# Patient Record
Sex: FEMALE | Race: WHITE | Employment: UNEMPLOYED | URBAN - NONMETROPOLITAN AREA
[De-identification: names, ages, dates, MRNs, and addresses within clinical notes are randomized per-mention and may not be internally consistent; named-entity substitution may affect disease eponyms.]

---
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
## 2022-01-24 ENCOUNTER — HOSPITAL ENCOUNTER (EMERGENCY)
Age: 22
Discharge: HOME | End: 2022-01-24
Attending: EMERGENCY MEDICINE
Payer: COMMERCIAL

## 2022-01-24 VITALS
SYSTOLIC BLOOD PRESSURE: 124 MMHG | WEIGHT: 182 LBS | TEMPERATURE: 98.9 F | OXYGEN SATURATION: 98 % | HEART RATE: 70 BPM | RESPIRATION RATE: 16 BRPM | DIASTOLIC BLOOD PRESSURE: 56 MMHG | HEIGHT: 63 IN | BODY MASS INDEX: 32.25 KG/M2

## 2022-01-24 DIAGNOSIS — Z00.00 WELL ADULT HEALTH CHECK: Primary | ICD-10-CM

## 2022-01-24 PROCEDURE — 99284 EMERGENCY DEPT VISIT MOD MDM: CPT

## 2022-01-24 RX ORDER — ACETAMINOPHEN 325 MG/1
650 TABLET ORAL EVERY 6 HOURS PRN
COMMUNITY
End: 2022-06-03 | Stop reason: ALTCHOICE

## 2022-01-24 ASSESSMENT — ENCOUNTER SYMPTOMS
COUGH: 0
CONSTIPATION: 0
WHEEZING: 0
ABDOMINAL DISTENTION: 0
RHINORRHEA: 0
SORE THROAT: 0
DIARRHEA: 0
NAUSEA: 0
SHORTNESS OF BREATH: 0
VOMITING: 0

## 2022-01-24 NOTE — ED PROVIDER NOTES
[facility] ED  Emergency Department        Pt Name: Lito Lara  MRN: [id number]  [place] 2000  Date of evaluation: 1/24/22    CHIEF COMPLAINT       Chief Complaint   Patient presents with    Allergic Reaction     Pt concerned she is having an allergic reaction after eating chicken. C/o hives, dizzy, tired       HISTORY OF PRESENT ILLNESS  (Location/Symptom, Timing/Onset, Context/Setting, Quality, Duration, ModifyingFactors, Severity.)      Lito Laar is a 25 y.o. female who presents with G2, P1, patient states that she was up eating chicken, and started to feel very flushed, and tired. She is about 16 weeks pregnant. She denies any abdominal pain or vaginal bleeding. She says that the thermostat at the house she is staying at was set at 80 degrees. And they were concerned that she was having an allergic reaction to some spices on the chicken that she was eating she denies any throat swelling. She was very tired she laid down she states she fell asleep. But was brought in by significant other due to concern for passing out and having an allergic reaction. Patient denies any complaints at this time is wanting to go home to go to sleep because she feels very tired. PAST MEDICAL / SURGICAL / SOCIAL / FAMILY HISTORY      has a past medical history of ADHD. has no past surgical history on file.        Social History     Socioeconomic History    Marital status: Single     Spouse name: Not on file    Number of children: Not on file    Years of education: Not on file    Highest education level: Not on file   Occupational History    Not on file   Tobacco Use    Smoking status: Current Every Day Smoker     Types: Cigarettes    Smokeless tobacco: Never Used   Vaping Use    Vaping Use: Former   Substance and Sexual Activity    Alcohol use: No    Drug use: Not Currently     Types: Marijuana Olene Ziggy)    Sexual activity: Not Currently     Partners: Male   Other Topics Concern  Not on file   Social History Narrative    Not on file     Social Determinants of Health     Financial Resource Strain:     Difficulty of Paying Living Expenses: Not on file   Food Insecurity:     Worried About Running Out of Food in the Last Year: Not on file    Zaina of Food in the Last Year: Not on file   Transportation Needs:     Lack of Transportation (Medical): Not on file    Lack of Transportation (Non-Medical): Not on file   Physical Activity:     Days of Exercise per Week: Not on file    Minutes of Exercise per Session: Not on file   Stress:     Feeling of Stress : Not on file   Social Connections:     Frequency of Communication with Friends and Family: Not on file    Frequency of Social Gatherings with Friends and Family: Not on file    Attends Hindu Services: Not on file    Active Member of [address] [place] [address] [employer] or Organizations: Not on file    Attends Club or Organization Meetings: Not on file    Marital Status: Not on file   Intimate Partner Violence:     Fear of Current or Ex-Partner: Not on file    Emotionally Abused: Not on file    Physically Abused: Not on file    Sexually Abused: Not on file   Housing Stability:     Unable to Pay for Housing in the Last Year: Not on file    Number of [place] in the Last Year: Not on file    Unstable Housing in the Last Year: Not on file       Family History   Problem Relation Age of Onset    Other Father         needed a lung tranplant    Heart Attack Mother     No Known Problems Brother     No Known Problems Sister     No Known Problems Sister     No Known Problems Sister     No Known Problems Sister     No Known Problems Sister     Breast Cancer Maternal Great Grandmother     No Known Problems Maternal Grandmother     No Known Problems Maternal Grandfather        Allergies:  Patient has no known allergies. Home Medications:  Prior to Admission medications    Medication Sig Start Date End Date Taking?  Authorizing Provider acetaminophen (TYLENOL) 325 MG tablet Take 650 mg by mouth every 6 hours as needed for Pain   Yes Historical Provider, MD   Prenatal Vit-Fe Fumarate-FA (PRENATAL VITAMIN) 27-0.8 MG TABS Take 1 tablet by mouth daily 11/22/21   Rashad Offer, APRN - CNM       REVIEW OF SYSTEMS    (2-9 systems for level 4, 10 or more for level 5)      Review of Systems   Constitutional: Negative for activity change, appetite change, fatigue and fever. HENT: Negative for congestion, rhinorrhea and sore throat. Respiratory: Negative for cough, shortness of breath and wheezing. Cardiovascular: Negative for chest pain, palpitations and leg swelling. Gastrointestinal: Negative for abdominal distention, constipation, diarrhea, nausea and vomiting. Genitourinary: Negative for decreased urine volume and dysuria. Skin: Positive for rash. Neurological: Negative for dizziness, syncope, weakness, light-headedness, numbness and headaches. PHYSICAL EXAM   (up to 7 for level 4, 8 or more for level 5)     INITIAL VITALS:   BP (!) 124/56   Pulse 70   Temp 98.9 °F (37.2 °C) (Tympanic)   Resp 16   Ht 5' 3\" (1.6 m)   Wt 182 lb (82.6 kg)   LMP 09/23/2021   SpO2 98%   BMI 32.24 kg/m²     Physical Exam  Constitutional:       General: She is not in acute distress. Appearance: Normal appearance. She is not ill-appearing. HENT:      Head: Normocephalic and atraumatic. Mouth/Throat:      Mouth: Mucous membranes are moist.      Pharynx: No oropharyngeal exudate or posterior oropharyngeal erythema. Comments: Uvula midline patient handling oral secretions no swelling to the posterior pharynx. No cervical lymphadenopathy palpated. Eyes:      General:         Right eye: No discharge. Left eye: No discharge. Extraocular Movements: Extraocular movements intact. Pupils: Pupils are equal, round, and reactive to light. Cardiovascular:      Rate and Rhythm: Normal rate.    Pulmonary:      Effort: Pulmonary effort is normal. No respiratory distress. Abdominal:      Comments: Patient with gravid uterus, nontender to palpation. Upper abdomen nontender to palpation no rebound or guarding noted, nonperitoneal.   Musculoskeletal:      Right lower leg: No edema. Left lower leg: No edema. Skin:     Comments: Patient has blanching patch of pa papules to her left mid back. No surrounding erythema. Does not appear to be a hive. She has an additional area of erythema approximately 8 mm to her left lateral forearm, that appears to be a bite lia. No other hives noted to her skin   Neurological:      General: No focal deficit present. Mental Status: She is alert and oriented to person, place, and time. DIFFERENTIAL  DIAGNOSIS     Patient well appearing, FHT documented at 170,   BP repeat 124/56. Plan fro discharge home    PLAN (LABS / IMAGING / EKG):  No orders of the defined types were placed in this encounter. MEDICATIONS ORDERED:  No orders of the defined types were placed in this encounter. DIAGNOSTIC RESULTS / EMERGENCY DEPARTMENT COURSE / MDM     LABS:  No results found for this visit on 01/24/22. IMPRESSION: dizziness    Patient well-appearing on exam, repeat blood pressure improved, plan for discharge home. Concern for allergic reaction. Plan for discharge he has follow-up with OB on January 31. FINAL IMPRESSION      1. Well adult health check          DISPOSITION / PLAN     DISPOSITION        PATIENT REFERRED TO:  No follow-up provider specified.     DISCHARGE MEDICATIONS:  New Prescriptions    No medications on file       Amilcar Hare DO  1:26 AM    Attending Emergency Physician  [address] [place] [address] ED    (Please note that portions of this note were completed with a voice recognition program.  Effortswere made to edit the dictations but occasionally words are mis-transcribed.)              Earna Crigler, DO  01/24/22 0300

## 2022-02-10 ENCOUNTER — ROUTINE PRENATAL (OUTPATIENT)
Dept: OBGYN | Age: 22
End: 2022-02-10
Payer: COMMERCIAL

## 2022-02-10 VITALS — DIASTOLIC BLOOD PRESSURE: 66 MMHG | WEIGHT: 181 LBS | BODY MASS INDEX: 32.06 KG/M2 | SYSTOLIC BLOOD PRESSURE: 112 MMHG

## 2022-02-10 DIAGNOSIS — Z34.82 ENCOUNTER FOR SUPERVISION OF OTHER NORMAL PREGNANCY IN SECOND TRIMESTER: ICD-10-CM

## 2022-02-10 DIAGNOSIS — Z3A.18 18 WEEKS GESTATION OF PREGNANCY: Primary | ICD-10-CM

## 2022-02-10 PROCEDURE — G8427 DOCREV CUR MEDS BY ELIG CLIN: HCPCS | Performed by: ADVANCED PRACTICE MIDWIFE

## 2022-02-10 PROCEDURE — 99213 OFFICE O/P EST LOW 20 MIN: CPT | Performed by: ADVANCED PRACTICE MIDWIFE

## 2022-02-10 PROCEDURE — G8419 CALC BMI OUT NRM PARAM NOF/U: HCPCS | Performed by: ADVANCED PRACTICE MIDWIFE

## 2022-02-10 PROCEDURE — G8484 FLU IMMUNIZE NO ADMIN: HCPCS | Performed by: ADVANCED PRACTICE MIDWIFE

## 2022-02-10 PROCEDURE — 4004F PT TOBACCO SCREEN RCVD TLK: CPT | Performed by: ADVANCED PRACTICE MIDWIFE

## 2022-02-10 RX ORDER — PNV NO.95/FERROUS FUM/FOLIC AC 28MG-0.8MG
1 TABLET ORAL DAILY
Qty: 30 TABLET | Refills: 5 | Status: SHIPPED
Start: 2022-02-10 | End: 2022-06-03

## 2022-02-10 NOTE — PROGRESS NOTES
Bertha Oseis is here at 18w3d for:    Chief Complaint   Patient presents with    Routine Prenatal Visit     C/O fatigue, needs prescription for prenatal vitamins. Unable to obtain urine. Estimated Due Date: Estimated Date of Delivery: 22    OB History    Para Term  AB Living   2 1 1     1   SAB IAB Ectopic Molar Multiple Live Births           0 1      # Outcome Date GA Lbr Sorin/2nd Weight Sex Delivery Anes PTL Lv   2 Current            1 Term 20 39w0d 08:49 / 00:07 6 lb 11.6 oz (3.049 kg) F Vag-Spont EPI N GREGORY      Complications: Placental abnormality        Past Medical History:   Diagnosis Date    ADHD        No past surgical history on file. Social History     Tobacco Use   Smoking Status Current Every Day Smoker    Types: Cigarettes   Smokeless Tobacco Never Used        Social History     Substance and Sexual Activity   Alcohol Use No       No results found for this visit on 02/10/22. HPI: here for routine ob visit, denies c/o     PT denies fever, chills, nausea and vomiting       Vitals:  Estimated body mass index is 32.06 kg/m² as calculated from the following:    Height as of 22: 5' 3\" (1.6 m). Weight as of this encounter: 181 lb (82.1 kg). BP: 112/66  Weight - Scale: 181 lb (82.1 kg)  Patient Position: Sitting  Movement: Present             Abdomen: enlarged, gravid,soft, nontender       Results reviewed today:    No results found for this visit on 02/10/22. See prenatal vital sign section and fetal assessment section    ASSESSMENT & Plan    Diagnosis Orders   1. 18 weeks gestation of pregnancy  Prenatal Vit-Fe Fumarate-FA (PRENATAL VITAMINS) 28-0.8 MG TABS   2. Encounter for supervision of other normal pregnancy in second trimester               I am having Yoly Lane start on Prenatal Vitamins. I am also having her maintain her Prenatal Vitamin and acetaminophen. Return in about 2 weeks (around 2022) for ob 20wkus.     There are no Patient Instructions on file for this visit.           MOSHE Arredondo CNM,2/10/2022 4:23 PM

## 2022-02-21 ENCOUNTER — ROUTINE PRENATAL (OUTPATIENT)
Dept: OBGYN | Age: 22
End: 2022-02-21
Payer: COMMERCIAL

## 2022-02-21 ENCOUNTER — HOSPITAL ENCOUNTER (OUTPATIENT)
Age: 22
Setting detail: SPECIMEN
Discharge: HOME | End: 2022-02-21
Payer: COMMERCIAL

## 2022-02-21 VITALS — WEIGHT: 181.2 LBS | BODY MASS INDEX: 32.1 KG/M2 | SYSTOLIC BLOOD PRESSURE: 118 MMHG | DIASTOLIC BLOOD PRESSURE: 72 MMHG

## 2022-02-21 DIAGNOSIS — Z3A.20 20 WEEKS GESTATION OF PREGNANCY: ICD-10-CM

## 2022-02-21 DIAGNOSIS — J00 NASOPHARYNGITIS: Primary | ICD-10-CM

## 2022-02-21 DIAGNOSIS — J00 NASOPHARYNGITIS: ICD-10-CM

## 2022-02-21 DIAGNOSIS — Z34.82 ENCOUNTER FOR SUPERVISION OF OTHER NORMAL PREGNANCY IN SECOND TRIMESTER: ICD-10-CM

## 2022-02-21 PROCEDURE — 87081 CULTURE SCREEN ONLY: CPT

## 2022-02-21 PROCEDURE — G8484 FLU IMMUNIZE NO ADMIN: HCPCS | Performed by: ADVANCED PRACTICE MIDWIFE

## 2022-02-21 PROCEDURE — 99213 OFFICE O/P EST LOW 20 MIN: CPT | Performed by: ADVANCED PRACTICE MIDWIFE

## 2022-02-21 PROCEDURE — G8419 CALC BMI OUT NRM PARAM NOF/U: HCPCS | Performed by: ADVANCED PRACTICE MIDWIFE

## 2022-02-21 PROCEDURE — 4004F PT TOBACCO SCREEN RCVD TLK: CPT | Performed by: ADVANCED PRACTICE MIDWIFE

## 2022-02-21 PROCEDURE — G8427 DOCREV CUR MEDS BY ELIG CLIN: HCPCS | Performed by: ADVANCED PRACTICE MIDWIFE

## 2022-02-21 RX ORDER — FLUTICASONE PROPIONATE 50 MCG
2 SPRAY, SUSPENSION (ML) NASAL DAILY
Qty: 48 G | Refills: 1 | Status: SHIPPED | OUTPATIENT
Start: 2022-02-21 | End: 2022-06-03 | Stop reason: ALTCHOICE

## 2022-02-21 RX ORDER — AZITHROMYCIN 250 MG/1
250 TABLET, FILM COATED ORAL SEE ADMIN INSTRUCTIONS
Qty: 6 TABLET | Refills: 0 | Status: SHIPPED | OUTPATIENT
Start: 2022-02-21 | End: 2022-02-26

## 2022-02-21 NOTE — PROGRESS NOTES
50 MCG/ACT nasal spray    Culture, Throat   2. Encounter for supervision of other normal pregnancy in second trimester     3. 20 weeks gestation of pregnancy               I am having Yoly Lane start on azithromycin and fluticasone. I am also having her maintain her Prenatal Vitamin, acetaminophen, and Prenatal Vitamins. Return in about 4 weeks (around 3/21/2022) for ob. There are no Patient Instructions on file for this visit.           MOSHE Howell CNM,2/21/2022 1:10 PM

## 2022-02-23 LAB
CULTURE: NORMAL
SPECIMEN DESCRIPTION: NORMAL

## 2022-03-21 ENCOUNTER — ROUTINE PRENATAL (OUTPATIENT)
Dept: OBGYN | Age: 22
End: 2022-03-21
Payer: COMMERCIAL

## 2022-03-21 VITALS — BODY MASS INDEX: 32.77 KG/M2 | WEIGHT: 185 LBS | DIASTOLIC BLOOD PRESSURE: 74 MMHG | SYSTOLIC BLOOD PRESSURE: 122 MMHG

## 2022-03-21 DIAGNOSIS — Z34.83 ENCOUNTER FOR SUPERVISION OF OTHER NORMAL PREGNANCY IN THIRD TRIMESTER: Primary | ICD-10-CM

## 2022-03-21 DIAGNOSIS — Z3A.24 24 WEEKS GESTATION OF PREGNANCY: ICD-10-CM

## 2022-03-21 DIAGNOSIS — Z3A.28 28 WEEKS GESTATION OF PREGNANCY: Primary | ICD-10-CM

## 2022-03-21 PROCEDURE — G8484 FLU IMMUNIZE NO ADMIN: HCPCS | Performed by: ADVANCED PRACTICE MIDWIFE

## 2022-03-21 PROCEDURE — 99213 OFFICE O/P EST LOW 20 MIN: CPT | Performed by: ADVANCED PRACTICE MIDWIFE

## 2022-03-21 PROCEDURE — G8427 DOCREV CUR MEDS BY ELIG CLIN: HCPCS | Performed by: ADVANCED PRACTICE MIDWIFE

## 2022-03-21 PROCEDURE — 4004F PT TOBACCO SCREEN RCVD TLK: CPT | Performed by: ADVANCED PRACTICE MIDWIFE

## 2022-03-21 PROCEDURE — G8419 CALC BMI OUT NRM PARAM NOF/U: HCPCS | Performed by: ADVANCED PRACTICE MIDWIFE

## 2022-03-21 NOTE — PROGRESS NOTES
Liz Negrete is here at 24w0d for:    Chief Complaint   Patient presents with    Routine Prenatal Visit     Instructions and orders for 1 hour gtt. at 28 weeks. Unable to obtain urine. C/O feeling dizzy off and on and fatigue. C/O toothache. Estimated Due Date: Estimated Date of Delivery: 22    OB History    Para Term  AB Living   2 1 1     1   SAB IAB Ectopic Molar Multiple Live Births           0 1      # Outcome Date GA Lbr Sorin/2nd Weight Sex Delivery Anes PTL Lv   2 Current            1 Term 20 39w0d 08:49 / 00:07 6 lb 11.6 oz (3.049 kg) F Vag-Spont EPI N GREGORY      Complications: Placental abnormality        Past Medical History:   Diagnosis Date    ADHD        No past surgical history on file. Social History     Tobacco Use   Smoking Status Current Every Day Smoker    Types: Cigarettes   Smokeless Tobacco Never Used        Social History     Substance and Sexual Activity   Alcohol Use No       No results found for this visit on 22. HPI: here for routine ob visit, c/o fatigue and toothache, has not had teeth worked on because she didn't think she could have \"numbing medicine\"     PT denies fever, chills, nausea and vomiting       Vitals:  Estimated body mass index is 32.77 kg/m² as calculated from the following:    Height as of 22: 5' 3\" (1.6 m). Weight as of this encounter: 185 lb (83.9 kg). BP: 122/74  Weight - Scale: 185 lb (83.9 kg)  Patient Position: Sitting  Movement: Present               Results reviewed today:    No results found for this visit on 22. ASSESSMENT & Plan    Diagnosis Orders   1. Encounter for supervision of other normal pregnancy in third trimester     2. 24 weeks gestation of pregnancy       Reassured to go to dentist and local anesthetic can be used    do labwork this week instead of waiting until 28    I am having Yoly [place] maintain her Prenatal Vitamin, acetaminophen, Prenatal Vitamins, and fluticasone. Return in about 4 weeks (around 4/18/2022) for ob. There are no Patient Instructions on file for this visit.           Lenette Lombard, APRN - CNM,3/21/2022 12:55 PM

## 2022-04-10 ENCOUNTER — HOSPITAL ENCOUNTER (EMERGENCY)
Age: 22
Discharge: STILL ADMITTED OR ADMINISTRATIVE | End: 2022-04-10
Attending: EMERGENCY MEDICINE
Payer: COMMERCIAL

## 2022-04-10 ENCOUNTER — HOSPITAL ENCOUNTER (OUTPATIENT)
Age: 22
Discharge: HOME | End: 2022-04-11
Attending: MIDWIFE | Admitting: MIDWIFE
Payer: COMMERCIAL

## 2022-04-10 PROBLEM — O46.92 VAGINAL BLEEDING IN PREGNANCY, SECOND TRIMESTER: Status: ACTIVE | Noted: 2022-04-10

## 2022-04-10 LAB
-: ABNORMAL
BACTERIA: ABNORMAL
BILIRUBIN URINE: NEGATIVE
COLOR: YELLOW
EPITHELIAL CELLS UA: ABNORMAL /HPF (ref 0–25)
GLUCOSE URINE: NEGATIVE
KETONES, URINE: NEGATIVE
LEUKOCYTE ESTERASE, URINE: ABNORMAL
NITRITE, URINE: NEGATIVE
PH UA: 6.5 (ref 5–9)
PROTEIN UA: NEGATIVE
RBC UA: ABNORMAL /HPF (ref 0–2)
SPECIFIC GRAVITY UA: 1.01 (ref 1.01–1.02)
TURBIDITY: CLEAR
URINE HGB: ABNORMAL
UROBILINOGEN, URINE: NORMAL
WBC UA: ABNORMAL /HPF (ref 0–5)

## 2022-04-10 PROCEDURE — 81001 URINALYSIS AUTO W/SCOPE: CPT

## 2022-04-10 PROCEDURE — 99281 EMR DPT VST MAYX REQ PHY/QHP: CPT

## 2022-04-10 PROCEDURE — 87086 URINE CULTURE/COLONY COUNT: CPT

## 2022-04-10 NOTE — LETTER
[facility] and Delivery  [address]  Phone: [phone]    No name on file. April 11, 2022     Patient: Reynold Giang   YOB: 2000   Date of Visit: 4/10/2022       To Whom It May Concern:    Claude Comber accompanied patient for an outpatient visit. If you have any questions or concerns, please don't hesitate to call. Sincerely,        No name on file.

## 2022-04-10 NOTE — LETTER
[facility] and Delivery  [address]  Phone: [phone]    No name on file. April 11, 2022     Patient: Jazmin Alonso   YOB: 2000   Date of Visit: 4/10/2022       To Whom It May Concern:    Chuyita Black accompanied patient for an out patient visit. If you have any questions or concerns, please don't hesitate to call. Sincerely,        No name on file.

## 2022-04-11 ENCOUNTER — TELEPHONE (OUTPATIENT)
Dept: OBGYN | Age: 22
End: 2022-04-11

## 2022-04-11 ENCOUNTER — APPOINTMENT (OUTPATIENT)
Dept: ULTRASOUND IMAGING | Age: 22
End: 2022-04-11
Payer: COMMERCIAL

## 2022-04-11 VITALS
HEIGHT: 63 IN | WEIGHT: 182 LBS | TEMPERATURE: 98.2 F | DIASTOLIC BLOOD PRESSURE: 57 MMHG | BODY MASS INDEX: 32.25 KG/M2 | HEART RATE: 68 BPM | SYSTOLIC BLOOD PRESSURE: 119 MMHG | RESPIRATION RATE: 16 BRPM

## 2022-04-11 PROCEDURE — 2580000003 HC RX 258: Performed by: MIDWIFE

## 2022-04-11 PROCEDURE — 76815 OB US LIMITED FETUS(S): CPT

## 2022-04-11 PROCEDURE — 6360000002 HC RX W HCPCS: Performed by: MIDWIFE

## 2022-04-11 PROCEDURE — 6370000000 HC RX 637 (ALT 250 FOR IP): Performed by: MIDWIFE

## 2022-04-11 RX ORDER — SODIUM CHLORIDE, SODIUM LACTATE, POTASSIUM CHLORIDE, CALCIUM CHLORIDE 600; 310; 30; 20 MG/100ML; MG/100ML; MG/100ML; MG/100ML
INJECTION, SOLUTION INTRAVENOUS CONTINUOUS
Status: DISCONTINUED | OUTPATIENT
Start: 2022-04-11 | End: 2022-04-11 | Stop reason: HOSPADM

## 2022-04-11 RX ORDER — NIFEDIPINE 10 MG/1
10 CAPSULE ORAL EVERY 8 HOURS SCHEDULED
Status: DISCONTINUED | OUTPATIENT
Start: 2022-04-11 | End: 2022-04-11 | Stop reason: HOSPADM

## 2022-04-11 RX ORDER — SODIUM CHLORIDE, SODIUM LACTATE, POTASSIUM CHLORIDE, CALCIUM CHLORIDE 600; 310; 30; 20 MG/100ML; MG/100ML; MG/100ML; MG/100ML
500 INJECTION, SOLUTION INTRAVENOUS ONCE
Status: COMPLETED | OUTPATIENT
Start: 2022-04-11 | End: 2022-04-11

## 2022-04-11 RX ORDER — CEPHALEXIN 500 MG/1
500 CAPSULE ORAL 2 TIMES DAILY
Status: DISCONTINUED | OUTPATIENT
Start: 2022-04-11 | End: 2022-04-11 | Stop reason: HOSPADM

## 2022-04-11 RX ADMIN — SODIUM CHLORIDE, POTASSIUM CHLORIDE, SODIUM LACTATE AND CALCIUM CHLORIDE 500 ML: 600; 310; 30; 20 INJECTION, SOLUTION INTRAVENOUS at 00:33

## 2022-04-11 RX ADMIN — SODIUM CHLORIDE, POTASSIUM CHLORIDE, SODIUM LACTATE AND CALCIUM CHLORIDE: 600; 310; 30; 20 INJECTION, SOLUTION INTRAVENOUS at 04:12

## 2022-04-11 RX ADMIN — CEPHALEXIN 500 MG: 500 CAPSULE ORAL at 07:47

## 2022-04-11 RX ADMIN — NIFEDIPINE 10 MG: 10 CAPSULE ORAL at 01:53

## 2022-04-11 RX ADMIN — NIFEDIPINE 10 MG: 10 CAPSULE ORAL at 07:48

## 2022-04-11 RX ADMIN — SODIUM CHLORIDE, POTASSIUM CHLORIDE, SODIUM LACTATE AND CALCIUM CHLORIDE: 600; 310; 30; 20 INJECTION, SOLUTION INTRAVENOUS at 01:08

## 2022-04-11 RX ADMIN — CEFAZOLIN SODIUM 2000 MG: 10 INJECTION, POWDER, FOR SOLUTION INTRAVENOUS at 00:52

## 2022-04-11 ASSESSMENT — PAIN DESCRIPTION - DESCRIPTORS: DESCRIPTORS: SHARP

## 2022-04-11 NOTE — FLOWSHEET NOTE
Permission for Ultrasound obtained from [facility], Shilpi Andres.   Imaging notified, will be in for exam.

## 2022-04-11 NOTE — FLOWSHEET NOTE
[facility], additional orders received. Ultrasound ordered for now.

## 2022-04-11 NOTE — FLOWSHEET NOTE
Visual inspection of perineum shows small amount of bright red bleeding at vaginal opening, no blood on peripad. [facility] notified.

## 2022-04-11 NOTE — FLOWSHEET NOTE
Pt arrived per wheelchair from ER with c/o bright red bleeding after urinating. Large amount on toilet paper and in water. Denies urinary symptoms. C/O intermittent pain to left lower quadrant and left flank that started after she urinated. Last intercourse x 2 days ago. Normal BM today.

## 2022-04-11 NOTE — FLOWSHEET NOTE
Jerad Diaz CNM called the unit at this time. Update given on reactive strip, pt sleeping throughout the night, occasional contractions. Confirmed blood type is A positive. States pt can go home. Have  perscriptions from pharmacy. Ordered procardia 10mg 1 tab Q8hr with a dispense of 90pills and one refill of 90pills and Keflex 500mg 1 PO BID dispense 14 with no refills. S Boni TERRELL also takes for pt to call FEI Serra CNM Tuesday or Wednesday for results of urine culture and to make an appointment to be seen in office this week.

## 2022-04-11 NOTE — FLOWSHEET NOTE
Phoned [facility] & [facility] to notify of pt arrival and complaints. Orders received.

## 2022-04-11 NOTE — ED NOTES
Patient taken to [place] for evaluation of vaginal bleeding.       Braulio Watkins RN  04/10/22 1979

## 2022-04-11 NOTE — TELEPHONE ENCOUNTER
Patient called and left message with questions about labs. I called patient back however voicemail not set up unable to leave message.

## 2022-04-11 NOTE — FLOWSHEET NOTE
Pt up to BR, voided 200cc clear yellow urine. No blood on peripad.   Scant amount of bright red blood on toilet paper after wiping, about the size of a 50 cent piece

## 2022-04-11 NOTE — FLOWSHEET NOTE
Work release provided and pt DCd home at this time; she left unit ambulatory to private car with no distress noted; copy of DC inst in hand along with meds provided; accompanied by family.  Rx called to [facility]

## 2022-04-11 NOTE — FLOWSHEET NOTE
Davian Ladd CNM called the unit. New orders received. Ordered to perform SVE, Boni stated the 20 week ultrasound did not show a low lying placenta. Call FEI Plata CNM with results of SVE.

## 2022-04-11 NOTE — FLOWSHEET NOTE
[facility] notified of US results of placenta posterior, cervical length 5.08, no abruption or bleeding visualized per US tech.

## 2022-04-11 NOTE — FLOWSHEET NOTE
IV DCd at this time with catheter tip intact, pt nathalia well; DC instructions explained with pt signature and verb understanding obtained; 1 dose of Procardia and 1 dose of Keflex sent home with pt for to take at 1000 per S Hotelling CNMs instruction; pt verb understanding of how and when to take meds; she also understands that she needs to  Rx from pharmacy later today as well and will take all antibiotics until completed; EFM discontinued and pt up to dress.

## 2022-04-12 LAB
CULTURE: NORMAL
SPECIMEN DESCRIPTION: NORMAL

## 2022-04-15 ENCOUNTER — HOSPITAL ENCOUNTER (OUTPATIENT)
Age: 22
Discharge: HOME | End: 2022-04-15

## 2022-04-15 DIAGNOSIS — Z3A.28 28 WEEKS GESTATION OF PREGNANCY: ICD-10-CM

## 2022-04-18 ENCOUNTER — ROUTINE PRENATAL (OUTPATIENT)
Dept: OBGYN | Age: 22
End: 2022-04-18
Payer: COMMERCIAL

## 2022-04-18 ENCOUNTER — HOSPITAL ENCOUNTER (OUTPATIENT)
Age: 22
Discharge: HOME | End: 2022-04-18
Payer: COMMERCIAL

## 2022-04-18 VITALS — DIASTOLIC BLOOD PRESSURE: 64 MMHG | WEIGHT: 191 LBS | BODY MASS INDEX: 33.83 KG/M2 | SYSTOLIC BLOOD PRESSURE: 122 MMHG

## 2022-04-18 DIAGNOSIS — Z34.83 ENCOUNTER FOR SUPERVISION OF OTHER NORMAL PREGNANCY IN THIRD TRIMESTER: Primary | ICD-10-CM

## 2022-04-18 DIAGNOSIS — Z3A.28 28 WEEKS GESTATION OF PREGNANCY: ICD-10-CM

## 2022-04-18 DIAGNOSIS — Z3A.28 28 WEEKS GESTATION OF PREGNANCY: Primary | ICD-10-CM

## 2022-04-18 LAB
GLUCOSE ADMINISTRATION: NORMAL
GLUCOSE TOLERANCE SCREEN 50G: 95 MG/DL (ref 70–135)
HEMOGLOBIN: 13.3 G/DL (ref 11.9–15.1)

## 2022-04-18 PROCEDURE — 82950 GLUCOSE TEST: CPT

## 2022-04-18 PROCEDURE — G8419 CALC BMI OUT NRM PARAM NOF/U: HCPCS | Performed by: ADVANCED PRACTICE MIDWIFE

## 2022-04-18 PROCEDURE — 4004F PT TOBACCO SCREEN RCVD TLK: CPT | Performed by: ADVANCED PRACTICE MIDWIFE

## 2022-04-18 PROCEDURE — 99213 OFFICE O/P EST LOW 20 MIN: CPT | Performed by: ADVANCED PRACTICE MIDWIFE

## 2022-04-18 PROCEDURE — G8427 DOCREV CUR MEDS BY ELIG CLIN: HCPCS | Performed by: ADVANCED PRACTICE MIDWIFE

## 2022-04-18 PROCEDURE — 85018 HEMOGLOBIN: CPT

## 2022-04-18 PROCEDURE — 36415 COLL VENOUS BLD VENIPUNCTURE: CPT

## 2022-04-18 NOTE — PROGRESS NOTES
Reynold Giang is here at 28w0d for:    Chief Complaint   Patient presents with    Routine Prenatal Visit     Patient had 1 hour gtt. drawn today, results in process. Prenatal vitamins cause nausea. Estimated Due Date: Estimated Date of Delivery: 22    OB History    Para Term  AB Living   2 1 1     1   SAB IAB Ectopic Molar Multiple Live Births           0 1      # Outcome Date GA Lbr Sorin/2nd Weight Sex Delivery Anes PTL Lv   2 Current            1 Term 20 39w0d 08:49 / 00:07 6 lb 11.6 oz (3.049 kg) F Vag-Spont EPI N GREGORY      Complications: Placental abnormality        Past Medical History:   Diagnosis Date    ADHD        No past surgical history on file. Social History     Tobacco Use   Smoking Status Current Every Day Smoker    Types: Cigarettes   Smokeless Tobacco Never Used        Social History     Substance and Sexual Activity   Alcohol Use No       No results found for this visit on 22. HPI: here for routine ob visit, was in ob with bleeding and cramping, was on antibiotics (stopped yesterday) and is taking     PT denies fever, chills, nausea and vomiting       Vitals:  Estimated body mass index is 33.83 kg/m² as calculated from the following:    Height as of 4/10/22: 5' 3\" (1.6 m). Weight as of this encounter: 191 lb (86.6 kg). BP: 122/64  Weight: 191 lb (86.6 kg)  Patient Position: Sitting  Albumin: Negative  Glucose: Negative  Movement: Present           Abdomen: round, gravid,soft, nontender    Results reviewed today:    No results found for this visit on 22. ASSESSMENT & Plan    Diagnosis Orders   1. Encounter for supervision of other normal pregnancy in third trimester     2. 28 weeks gestation of pregnancy         continue procardia      I am having Yoly [place]uleyma [place] maintain her Prenatal Vitamin, acetaminophen, Prenatal Vitamins, and fluticasone. Return in about 2 weeks (around 2022) for ob 30wkUS+tdap.     There are no Patient Instructions on file for this visit.           [facility] [address]. 4:28 PM

## 2022-04-22 ENCOUNTER — HOSPITAL ENCOUNTER (OUTPATIENT)
Age: 22
Discharge: HOME | End: 2022-04-22
Attending: MIDWIFE | Admitting: MIDWIFE
Payer: COMMERCIAL

## 2022-04-22 VITALS
HEART RATE: 78 BPM | HEIGHT: 63 IN | WEIGHT: 191 LBS | RESPIRATION RATE: 18 BRPM | BODY MASS INDEX: 33.84 KG/M2 | DIASTOLIC BLOOD PRESSURE: 66 MMHG | SYSTOLIC BLOOD PRESSURE: 123 MMHG

## 2022-04-22 PROBLEM — O46.90 VAGINAL BLEEDING DURING PREGNANCY, ANTEPARTUM: Status: ACTIVE | Noted: 2022-04-22

## 2022-04-22 LAB
-: ABNORMAL
BACTERIA: ABNORMAL
BILIRUBIN URINE: NEGATIVE
COLOR: YELLOW
EPITHELIAL CELLS UA: ABNORMAL /HPF (ref 0–25)
GLUCOSE URINE: NEGATIVE
KETONES, URINE: NEGATIVE
LEUKOCYTE ESTERASE, URINE: ABNORMAL
MUCUS: ABNORMAL
NITRITE, URINE: NEGATIVE
PH UA: 7 (ref 5–9)
PROTEIN UA: NEGATIVE
RBC UA: ABNORMAL /HPF (ref 0–2)
SPECIFIC GRAVITY UA: 1.02 (ref 1.01–1.02)
TURBIDITY: CLEAR
URINE HGB: ABNORMAL
UROBILINOGEN, URINE: NORMAL
WBC UA: ABNORMAL /HPF (ref 0–5)

## 2022-04-22 PROCEDURE — 81001 URINALYSIS AUTO W/SCOPE: CPT

## 2022-05-02 ENCOUNTER — ROUTINE PRENATAL (OUTPATIENT)
Dept: OBGYN | Age: 22
End: 2022-05-02
Payer: COMMERCIAL

## 2022-05-02 VITALS — WEIGHT: 193 LBS | BODY MASS INDEX: 34.19 KG/M2 | SYSTOLIC BLOOD PRESSURE: 124 MMHG | DIASTOLIC BLOOD PRESSURE: 74 MMHG

## 2022-05-02 DIAGNOSIS — Z34.83 ENCOUNTER FOR SUPERVISION OF OTHER NORMAL PREGNANCY IN THIRD TRIMESTER: Primary | ICD-10-CM

## 2022-05-02 DIAGNOSIS — Z3A.30 30 WEEKS GESTATION OF PREGNANCY: ICD-10-CM

## 2022-05-02 PROCEDURE — G8427 DOCREV CUR MEDS BY ELIG CLIN: HCPCS | Performed by: ADVANCED PRACTICE MIDWIFE

## 2022-05-02 PROCEDURE — 99213 OFFICE O/P EST LOW 20 MIN: CPT | Performed by: ADVANCED PRACTICE MIDWIFE

## 2022-05-02 PROCEDURE — G8419 CALC BMI OUT NRM PARAM NOF/U: HCPCS | Performed by: ADVANCED PRACTICE MIDWIFE

## 2022-05-02 PROCEDURE — 4004F PT TOBACCO SCREEN RCVD TLK: CPT | Performed by: ADVANCED PRACTICE MIDWIFE

## 2022-05-02 NOTE — PATIENT INSTRUCTIONS
Patient Education        Tdap (Tetanus, Diphtheria, Pertussis) Vaccine: What You Need to Know  Why get vaccinated? Tdap vaccine can prevent tetanus, diphtheria, and pertussis. Diphtheria and pertussis spread from person to person. Tetanus enters the bodythrough cuts or wounds.  TETANUS (T) causes painful stiffening of the muscles. Tetanus can lead to serious health problems, including being unable to open the mouth, having trouble swallowing and breathing, or death.  DIPHTHERIA (D) can lead to difficulty breathing, heart failure, paralysis, or death.  PERTUSSIS (aP), also known as \"whooping cough,\" can cause uncontrollable, violent coughing that makes it hard to breathe, eat, or drink. Pertussis can be extremely serious especially in babies and young children, causing pneumonia, convulsions, brain damage, or death. In teens and adults, it can cause weight loss, loss of bladder control, passing out, and rib fractures from severe coughing. Tdap vaccine  Tdap is only for children 7 years and older, adolescents, and adults. Adolescents should receive a single dose of Tdap, preferably at age 6 or 15 years. Pregnant people should get a dose of Tdap during every pregnancy, preferably during the early part of the third trimester, to help protect the  from pertussis. Infants are most at risk for severe, life-threatening complications frompertussis. Adults who have never received Tdap should get a dose of Tdap. Also, adults should receive a booster dose of either Tdap or Td (a different vaccine that protects against tetanus and diphtheria but not pertussis) every 10 years, or after 5 years in the case of a severe or dirty wound or burn. Tdap may be given at the same time as other vaccines.   Talk with your health care provider  Tell your vaccination provider if the person getting the vaccine:   Has had an allergic reaction after a previous dose of any vaccine that protects against tetanus, diphtheria, or pertussis, or has any severe, life-threatening allergies   Has had a coma, decreased level of consciousness, or prolonged seizures within 7 days after a previous dose of any pertussis vaccine (DTP, DTaP, or Tdap)   Has seizures or another nervous system problem   Has ever had Guillain-Barré Syndrome (also called \"GBS\")   Has had severe pain or swelling after a previous dose of any vaccine that protects against tetanus or diphtheria  In some cases, your health care provider may decide to postpone Tdapvaccination until a future visit. People with minor illnesses, such as a cold, may be vaccinated. People who are moderately or severely ill should usually wait until they recover beforegetting Tdap vaccine. Your health care provider can give you more information. Risks of a vaccine reaction   Pain, redness, or swelling where the shot was given, mild fever, headache, feeling tired, and nausea, vomiting, diarrhea, or stomachache sometimes happen after Tdap vaccination. People sometimes faint after medical procedures, including vaccination. Tellyour provider if you feel dizzy or have vision changes or ringing in the ears. As with any medicine, there is a very remote chance of a vaccine causing asevere allergic reaction, other serious injury, or death. What if there is a serious problem? An allergic reaction could occur after the vaccinated person leaves the clinic. If you see signs of a severe allergic reaction (hives, swelling of the face and throat, difficulty breathing, a fast heartbeat, dizziness, or weakness), call 9-1-1 and get the person to the nearest hospital.  For other signs that concern you, call your health care provider. Adverse reactions should be reported to the Vaccine Adverse Event Reporting System (VAERS). Your health care provider will usually file this report, or you can do it yourself. Visit the VAERS website at www.vaers. hhs.gov or call 9-580.748.8879.  VAERS is only for reporting reactions, and [facility] staff members do not give medical advice. The National Vaccine Injury Compensation Program  The National Vaccine Injury Compensation Program (VICP) is a federal program that was created to compensate people who may have been injured by certain vaccines. Claims regarding alleged injury or death due to vaccination have a time limit for filing, which may be as short as two years. Visit the VICP website at www.[facility].gov/vaccinecompensation or call 6-577.259.9836 to learn about the program and about filing a claim. How can I learn more?  Ask your health care provider.  Call your local or state health department.  Visit the website of the Food and Drug Administration (FDA) for vaccine package inserts and additional information at www.fda.gov/vaccines-blood-biologics/vaccines.  Contact the Centers for Disease Control and Prevention (CDC):  ? Call 2-745.734.7783 (1-800-CDC-INFO) or  ? Visit CDC's website at www.cdc.gov/vaccines. Vaccine Information Statement  Tdap (Tetanus, Diphtheria, Pertussis) Vaccine  8/6/2021  42 UZuleyma Osullivan [id number]  [facility] and [facility] for Disease Control and Prevention  Many vaccine information statements are available in [place] and other languages. See www.immunize.org/vis  Hojas de información sobre vacunas están disponibles en español y en muchos otros idiomas. Visite www.immunize.org/vis  Care instructions adapted under license by [facility] ([facility]). If you have questions about a medical condition or this instruction, always ask your healthcare professional. [place] [address] any warranty or liability for your use of this information. Patient Education        Tdap (Tetanus, Diphtheria, Pertussis) Vaccine: What You Need to Know  Why get vaccinated? Tdap vaccine can prevent tetanus, diphtheria, and pertussis. Diphtheria and pertussis spread from person to person. Tetanus enters the bodythrough cuts or wounds.    TETANUS (T) causes painful stiffening of the muscles. Tetanus can lead to serious health problems, including being unable to open the mouth, having trouble swallowing and breathing, or death.  DIPHTHERIA (D) can lead to difficulty breathing, heart failure, paralysis, or death.  PERTUSSIS (aP), also known as \"whooping cough,\" can cause uncontrollable, violent coughing that makes it hard to breathe, eat, or drink. Pertussis can be extremely serious especially in babies and young children, causing pneumonia, convulsions, brain damage, or death. In teens and adults, it can cause weight loss, loss of bladder control, passing out, and rib fractures from severe coughing. Tdap vaccine  Tdap is only for children 7 years and older, adolescents, and adults. Adolescents should receive a single dose of Tdap, preferably at age 6 or 15 years. Pregnant people should get a dose of Tdap during every pregnancy, preferably during the early part of the third trimester, to help protect the  from pertussis. Infants are most at risk for severe, life-threatening complications frompertussis. Adults who have never received Tdap should get a dose of Tdap. Also, adults should receive a booster dose of either Tdap or Td (a different vaccine that protects against tetanus and diphtheria but not pertussis) every 10 years, or after 5 years in the case of a severe or dirty wound or burn. Tdap may be given at the same time as other vaccines.   Talk with your health care provider  Tell your vaccination provider if the person getting the vaccine:   Has had an allergic reaction after a previous dose of any vaccine that protects against tetanus, diphtheria, or pertussis, or has any severe, life-threatening allergies   Has had a coma, decreased level of consciousness, or prolonged seizures within 7 days after a previous dose of any pertussis vaccine (DTP, DTaP, or Tdap)   Has seizures or another nervous system problem   Has ever had Guillain-Barré Syndrome (also called \"GBS\")   Has had severe pain or swelling after a previous dose of any vaccine that protects against tetanus or diphtheria  In some cases, your health care provider may decide to postpone Tdapvaccination until a future visit. People with minor illnesses, such as a cold, may be vaccinated. People who are moderately or severely ill should usually wait until they recover beforegetting Tdap vaccine. Your health care provider can give you more information. Risks of a vaccine reaction   Pain, redness, or swelling where the shot was given, mild fever, headache, feeling tired, and nausea, vomiting, diarrhea, or stomachache sometimes happen after Tdap vaccination. People sometimes faint after medical procedures, including vaccination. Tellyour provider if you feel dizzy or have vision changes or ringing in the ears. As with any medicine, there is a very remote chance of a vaccine causing asevere allergic reaction, other serious injury, or death. What if there is a serious problem? An allergic reaction could occur after the vaccinated person leaves the clinic. If you see signs of a severe allergic reaction (hives, swelling of the face and throat, difficulty breathing, a fast heartbeat, dizziness, or weakness), call 9-1-1 and get the person to the nearest hospital.  For other signs that concern you, call your health care provider. Adverse reactions should be reported to the Vaccine Adverse Event Reporting System (VAERS). Your health care provider will usually file this report, or you can do it yourself. Visit the VAERS website at www.vaers. hhs.gov or call 2-975.450.9183. VAERS is only for reporting reactions, and VAERS staff members do not give medical advice. The National Vaccine Injury Compensation Program  The National Vaccine Injury Compensation Program (VICP) is a federal program that was created to compensate people who may have been injured by certain vaccines.  Claims regarding alleged injury or death due to vaccination have a time limit for filing, which may be as short as two years. Visit the VICP website at www.[facility].gov/vaccinecompensation or call 1-327.149.1823 to learn about the program and about filing a claim. How can I learn more?  Ask your health care provider.  Call your local or state health department.  Visit the website of the Food and Drug Administration (FDA) for vaccine package inserts and additional information at www.fda.gov/vaccines-blood-biologics/vaccines.  Contact the Centers for Disease Control and Prevention (CDC):  ? Call 6-412.185.3257 (1-800-CDC-INFO) or  ? Visit CDC's website at www.cdc.gov/vaccines. Vaccine Information Statement  Tdap (Tetanus, Diphtheria, Pertussis) Vaccine  8/6/2021  42 NIKIZuleyma Annesabiha [id number]  [facility] for Disease Control and Prevention  Many vaccine information statements are available in [place]i and other languages. See www.immunize.org/vis  Hojas de información sobre vacunas están disponibles en español y en muchos otros idiomas. Visite www.immunize.org/vis  Care instructions adapted under license by [facility] ([facility]). If you have questions about a medical condition or this instruction, always ask your healthcare professional. [address] any warranty or liability for your use of this information.

## 2022-05-02 NOTE — PROGRESS NOTES
Callum Romero is here at 30w0d for:    Chief Complaint   Patient presents with    Routine Prenatal Visit     Follow up in house ultrasound. Unable to obtain urine. Decline TDAP. Estimated Due Date: Estimated Date of Delivery: 22    OB History    Para Term  AB Living   2 1 1     1   SAB IAB Ectopic Molar Multiple Live Births           0 1      # Outcome Date GA Lbr Sorin/2nd Weight Sex Delivery Anes PTL Lv   2 Current            1 Term 20 39w0d 08:49 / 00:07 6 lb 11.6 oz (3.049 kg) F Vag-Spont EPI N GREGORY      Complications: Placental abnormality        Past Medical History:   Diagnosis Date    ADHD        No past surgical history on file. Social History     Tobacco Use   Smoking Status Current Every Day Smoker    Types: Cigarettes   Smokeless Tobacco Never Used        Social History     Substance and Sexual Activity   Alcohol Use No       No results found for this visit on 22. HPI: here for routine ob visit, denies c/o requests d/c bedrest     PT denies fever, chills, nausea and vomiting       Vitals:  Estimated body mass index is 34.19 kg/m² as calculated from the following:    Height as of 22: 5' 3\" (1.6 m). Weight as of this encounter: 193 lb (87.5 kg). BP: 124/74  Weight: 193 lb (87.5 kg)  Patient Position: Sitting  Movement: Present  Presentation: Vertex           Abdomen: round,soft, nontender    Results reviewed today:  Sono:  30.4 WK IUP  EFW: 51%, 3 lb 5 oz  CL: 4.2 cm  SHIV: 14.9 cm  HR: 148 bpm  Posterior gr 2 placenta, vertex presentation  Active fetal movements  No results found for this visit on 22. ASSESSMENT & Plan    Diagnosis Orders   1. Encounter for supervision of other normal pregnancy in third trimester     2. 30 weeks gestation of pregnancy         with long cervix and denying contractions, bedrest d/c'd      I am having Yoly Roach Current maintain her Prenatal Vitamin, acetaminophen, Prenatal Vitamins, and fluticasone. Return in about 2 weeks (around 2022) for ob. Patient Instructions     Patient Education        Tdap (Tetanus, Diphtheria, Pertussis) Vaccine: What You Need to Know  Why get vaccinated? Tdap vaccine can prevent tetanus, diphtheria, and pertussis. Diphtheria and pertussis spread from person to person. Tetanus enters the bodythrough cuts or wounds.  TETANUS (T) causes painful stiffening of the muscles. Tetanus can lead to serious health problems, including being unable to open the mouth, having trouble swallowing and breathing, or death.  DIPHTHERIA (D) can lead to difficulty breathing, heart failure, paralysis, or death.  PERTUSSIS (aP), also known as \"whooping cough,\" can cause uncontrollable, violent coughing that makes it hard to breathe, eat, or drink. Pertussis can be extremely serious especially in babies and young children, causing pneumonia, convulsions, brain damage, or death. In teens and adults, it can cause weight loss, loss of bladder control, passing out, and rib fractures from severe coughing. Tdap vaccine  Tdap is only for children 7 years and older, adolescents, and adults. Adolescents should receive a single dose of Tdap, preferably at age 6 or 15 years. Pregnant people should get a dose of Tdap during every pregnancy, preferably during the early part of the third trimester, to help protect the  from pertussis. Infants are most at risk for severe, life-threatening complications frompertussis. Adults who have never received Tdap should get a dose of Tdap. Also, adults should receive a booster dose of either Tdap or Td (a different vaccine that protects against tetanus and diphtheria but not pertussis) every 10 years, or after 5 years in the case of a severe or dirty wound or burn. Tdap may be given at the same time as other vaccines.   Talk with your health care provider  Tell your vaccination provider if the person getting the vaccine:   Has had an allergic reaction after a previous dose of any vaccine that protects against tetanus, diphtheria, or pertussis, or has any severe, life-threatening allergies   Has had a coma, decreased level of consciousness, or prolonged seizures within 7 days after a previous dose of any pertussis vaccine (DTP, DTaP, or Tdap)   Has seizures or another nervous system problem   Has ever had Guillain-Barré Syndrome (also called \"GBS\")   Has had severe pain or swelling after a previous dose of any vaccine that protects against tetanus or diphtheria  In some cases, your health care provider may decide to postpone Tdapvaccination until a future visit. People with minor illnesses, such as a cold, may be vaccinated. People who are moderately or severely ill should usually wait until they recover beforegetting Tdap vaccine. Your health care provider can give you more information. Risks of a vaccine reaction   Pain, redness, or swelling where the shot was given, mild fever, headache, feeling tired, and nausea, vomiting, diarrhea, or stomachache sometimes happen after Tdap vaccination. People sometimes faint after medical procedures, including vaccination. Tellyour provider if you feel dizzy or have vision changes or ringing in the ears. As with any medicine, there is a very remote chance of a vaccine causing asevere allergic reaction, other serious injury, or death. What if there is a serious problem? An allergic reaction could occur after the vaccinated person leaves the clinic. If you see signs of a severe allergic reaction (hives, swelling of the face and throat, difficulty breathing, a fast heartbeat, dizziness, or weakness), call 9-1-1 and get the person to the nearest hospital.  For other signs that concern you, call your health care provider. Adverse reactions should be reported to the Vaccine Adverse Event Reporting System (VAERS). Your health care provider will usually file this report, or you can do it yourself. Visit the VAERS website at www.vaers. [facility].gov or call 7-982.911.1046. VAERS is only for reporting reactions, and VAERS staff members do not give medical advice. The National Vaccine Injury Compensation Program  The National Vaccine Injury Compensation Program (VICP) is a federal program that was created to compensate people who may have been injured by certain vaccines. Claims regarding alleged injury or death due to vaccination have a time limit for filing, which may be as short as two years. Visit the VICP website at www.hrsa.gov/vaccinecompensation or call 9-290.965.8186 to learn about the program and about filing a claim. How can I learn more?  Ask your health care provider.  Call your local or state health department.  Visit the website of the Food and Drug Administration (FDA) for vaccine package inserts and additional information at www.fda.gov/vaccines-blood-biologics/vaccines.  Contact the Centers for Disease Control and Prevention (CDC):  ? Call 9-968.590.9516 (1-800-CDC-INFO) or  ? Visit CDC's website at www.cdc.gov/vaccines. Vaccine Information Statement  Tdap (Tetanus, Diphtheria, Pertussis) Vaccine  8/6/2021  42 LARRY Gladis Tianleesa [id number]  [facility] and [facility] for Disease Control and Prevention  Many vaccine information statements are available in Frisian and other languages. See www.immunize.org/vis  Hojas de información sobre vacunas están disponibles en español y en muchos otros idiomas. Visite www.immunize.org/vis  Care instructions adapted under license by [facility] ([facility]). If you have questions about a medical condition or this instruction, always ask your healthcare professional. [place] [address] any warranty or liability for your use of this information. Patient Education        Tdap (Tetanus, Diphtheria, Pertussis) Vaccine: What You Need to Know  Why get vaccinated? Tdap vaccine can prevent tetanus, diphtheria, and pertussis.   Diphtheria and pertussis spread from person to person. Tetanus enters the bodythrough cuts or wounds.  TETANUS (T) causes painful stiffening of the muscles. Tetanus can lead to serious health problems, including being unable to open the mouth, having trouble swallowing and breathing, or death.  DIPHTHERIA (D) can lead to difficulty breathing, heart failure, paralysis, or death.  PERTUSSIS (aP), also known as \"whooping cough,\" can cause uncontrollable, violent coughing that makes it hard to breathe, eat, or drink. Pertussis can be extremely serious especially in babies and young children, causing pneumonia, convulsions, brain damage, or death. In teens and adults, it can cause weight loss, loss of bladder control, passing out, and rib fractures from severe coughing. Tdap vaccine  Tdap is only for children 7 years and older, adolescents, and adults. Adolescents should receive a single dose of Tdap, preferably at age 6 or 15 years. Pregnant people should get a dose of Tdap during every pregnancy, preferably during the early part of the third trimester, to help protect the  from pertussis. Infants are most at risk for severe, life-threatening complications frompertussis. Adults who have never received Tdap should get a dose of Tdap. Also, adults should receive a booster dose of either Tdap or Td (a different vaccine that protects against tetanus and diphtheria but not pertussis) every 10 years, or after 5 years in the case of a severe or dirty wound or burn. Tdap may be given at the same time as other vaccines.   Talk with your health care provider  Tell your vaccination provider if the person getting the vaccine:   Has had an allergic reaction after a previous dose of any vaccine that protects against tetanus, diphtheria, or pertussis, or has any severe, life-threatening allergies   Has had a coma, decreased level of consciousness, or prolonged seizures within 7 days after a previous dose of any pertussis vaccine (DTP, DTaP, or Tdap)   Has seizures or another nervous system problem   Has ever had Guillain-Barré Syndrome (also called \"GBS\")   Has had severe pain or swelling after a previous dose of any vaccine that protects against tetanus or diphtheria  In some cases, your health care provider may decide to postpone Tdapvaccination until a future visit. People with minor illnesses, such as a cold, may be vaccinated. People who are moderately or severely ill should usually wait until they recover beforegetting Tdap vaccine. Your health care provider can give you more information. Risks of a vaccine reaction   Pain, redness, or swelling where the shot was given, mild fever, headache, feeling tired, and nausea, vomiting, diarrhea, or stomachache sometimes happen after Tdap vaccination. People sometimes faint after medical procedures, including vaccination. Tellyour provider if you feel dizzy or have vision changes or ringing in the ears. As with any medicine, there is a very remote chance of a vaccine causing asevere allergic reaction, other serious injury, or death. What if there is a serious problem? An allergic reaction could occur after the vaccinated person leaves the clinic. If you see signs of a severe allergic reaction (hives, swelling of the face and throat, difficulty breathing, a fast heartbeat, dizziness, or weakness), call 9-1-1 and get the person to the nearest hospital.  For other signs that concern you, call your health care provider. Adverse reactions should be reported to the Vaccine Adverse Event Reporting System (VAERS). Your health care provider will usually file this report, or you can do it yourself. Visit the VAERS website at www.vaers. hhs.gov or call 0-222.903.4243. VAERS is only for reporting reactions, and VAERS staff members do not give medical advice.   The [employer] [place] Vaccine Injury Compensation Program  The [employer] [place] Vaccine Injury Compensation Program (VICP) is a federal program that was created to compensate people who may have been injured by certain vaccines. Claims regarding alleged injury or death due to vaccination have a time limit for filing, which may be as short as two years. Visit the VICP website at www.[facility].gov/vaccinecompensation or call 9-110.670.1085 to learn about the program and about filing a claim. How can I learn more?  Ask your health care provider.  Call your local or state health department.  Visit the website of the Food and Drug Administration (FDA) for vaccine package inserts and additional information at www.fda.gov/vaccines-blood-biologics/vaccines.  Contact the Centers for Disease Control and Prevention (CDC):  ? Call 7-541.502.9578 (1-691-HYX-INFO) or  ? Visit CDC's website at www.cdc.gov/vaccines. Vaccine Information Statement  Tdap (Tetanus, Diphtheria, Pertussis) Vaccine  8/6/2021  42 U. Marlin Stands [id number]  [facility] and [facility] for Disease Control and Prevention  Many vaccine information statements are available in Ecuadorean and other languages. See www.immunize.org/vis  Hojas de información sobre vacunas están disponibles en español y en muchos otros idiomas. Visite www.immunize.org/vis  Care instructions adapted under license by [facility] ([facility]). If you have questions about a medical condition or this instruction, always ask your healthcare professional. [address] any warranty or liability for your use of this information.                    MOSHE Weinberg CNM,5/2/2022 11:36 AM

## 2022-05-19 ENCOUNTER — ROUTINE PRENATAL (OUTPATIENT)
Dept: OBGYN | Age: 22
End: 2022-05-19
Payer: COMMERCIAL

## 2022-05-19 ENCOUNTER — HOSPITAL ENCOUNTER (OUTPATIENT)
Age: 22
Setting detail: SPECIMEN
Discharge: HOME | End: 2022-05-19
Payer: COMMERCIAL

## 2022-05-19 VITALS — DIASTOLIC BLOOD PRESSURE: 74 MMHG | BODY MASS INDEX: 34.72 KG/M2 | WEIGHT: 196 LBS | SYSTOLIC BLOOD PRESSURE: 122 MMHG

## 2022-05-19 DIAGNOSIS — R82.90 CLOUDY URINE: ICD-10-CM

## 2022-05-19 DIAGNOSIS — O99.891 BACK PAIN AFFECTING PREGNANCY IN THIRD TRIMESTER: ICD-10-CM

## 2022-05-19 DIAGNOSIS — M54.9 BACK PAIN AFFECTING PREGNANCY IN THIRD TRIMESTER: ICD-10-CM

## 2022-05-19 DIAGNOSIS — Z3A.34 34 WEEKS GESTATION OF PREGNANCY: ICD-10-CM

## 2022-05-19 DIAGNOSIS — Z34.83 ENCOUNTER FOR SUPERVISION OF OTHER NORMAL PREGNANCY IN THIRD TRIMESTER: Primary | ICD-10-CM

## 2022-05-19 PROCEDURE — 81002 URINALYSIS NONAUTO W/O SCOPE: CPT | Performed by: ADVANCED PRACTICE MIDWIFE

## 2022-05-19 PROCEDURE — G8428 CUR MEDS NOT DOCUMENT: HCPCS | Performed by: ADVANCED PRACTICE MIDWIFE

## 2022-05-19 PROCEDURE — 87086 URINE CULTURE/COLONY COUNT: CPT

## 2022-05-19 PROCEDURE — G8419 CALC BMI OUT NRM PARAM NOF/U: HCPCS | Performed by: ADVANCED PRACTICE MIDWIFE

## 2022-05-19 PROCEDURE — 99213 OFFICE O/P EST LOW 20 MIN: CPT | Performed by: ADVANCED PRACTICE MIDWIFE

## 2022-05-19 PROCEDURE — 4004F PT TOBACCO SCREEN RCVD TLK: CPT | Performed by: ADVANCED PRACTICE MIDWIFE

## 2022-05-19 NOTE — PROGRESS NOTES
Alaine Nissen is here at [address] [facility] for:    Chief Complaint   Patient presents with    Routine Prenatal Visit     C/O back pain, difficulty bending. Large amout of leukocytes in urine. Estimated Due Date: Estimated Date of Delivery: 22    OB History    Para Term  AB Living   2 1 1     1   SAB IAB Ectopic Molar Multiple Live Births           0 1      # Outcome Date GA Lbr Sorin/2nd Weight Sex Delivery Anes PTL Lv   2 Current            1 Term 20 39w0d 08:49 / 00:07 6 lb 11.6 oz (3.049 kg) F Vag-Spont EPI N GREGORY      Complications: Placental abnormality        Past Medical History:   Diagnosis Date    ADHD        No past surgical history on file. Social History     Tobacco Use   Smoking Status Current Every Day Smoker    Types: Cigarettes   Smokeless Tobacco Never Used        Social History     Substance and Sexual Activity   Alcohol Use No       Results for orders placed or performed in visit on 22   POCT Urinalysis no Micro   Result Value Ref Range    Color, UA yellow     Clarity, UA cloudy     Glucose, UA POC neg     Bilirubin, UA neg     Ketones, UA neg     Spec Grav, UA 1.010     Blood, UA POC neg     pH, UA 6     Protein, UA POC trace     Urobilinogen, UA neg     Leukocytes, UA 3+     Nitrite, UA neg            HPI: here for routine ob visit, c/o back pain     PT denies fever, chills, nausea and vomiting       Vitals:  Estimated body mass index is 34.72 kg/m² as calculated from the following:    Height as of 22: 5' 3\" (1.6 m). Weight as of this encounter: 196 lb (88.9 kg).   BP: 122/74  Weight: 196 lb (88.9 kg)  Patient Position: Sitting  Albumin: Trace  Glucose: Negative  Fundal Height (cm): 34 cm  Fetal Heart Rate: 136  Movement: Present           Abdomen: round,soft, nontender    Results reviewed today:    Results for orders placed or performed in visit on 22   POCT Urinalysis no Micro   Result Value Ref Range    Color, UA yellow     Clarity, UA cloudy Glucose, UA POC neg     Bilirubin, UA neg     Ketones, UA neg     Spec Grav, UA 1.010     Blood, UA POC neg     pH, UA 6     Protein, UA POC trace     Urobilinogen, UA neg     Leukocytes, UA 3+     Nitrite, UA neg            ASSESSMENT & Plan    Diagnosis Orders   1. Encounter for supervision of other normal pregnancy in third trimester     2. Cloudy urine  POCT Urinalysis no Micro   3. Back pain affecting pregnancy in third trimester  Culture, Urine   4. 34 weeks gestation of pregnancy               I am having Yoly Godinez maintain her Prenatal Vitamin, acetaminophen, Prenatal Vitamins, and fluticasone. Return in about 2 weeks (around 6/2/2022) for obgbs. There are no Patient Instructions on file for this visit.           MOSHE Weir CNM,5/19/2022 10:52 AM

## 2022-05-20 LAB
CULTURE: NORMAL
SPECIMEN DESCRIPTION: NORMAL

## 2022-06-02 ENCOUNTER — ROUTINE PRENATAL (OUTPATIENT)
Dept: OBGYN | Age: 22
End: 2022-06-02
Payer: COMMERCIAL

## 2022-06-02 VITALS — SYSTOLIC BLOOD PRESSURE: 122 MMHG | BODY MASS INDEX: 35.78 KG/M2 | WEIGHT: 202 LBS | DIASTOLIC BLOOD PRESSURE: 80 MMHG

## 2022-06-02 DIAGNOSIS — Z34.83 ENCOUNTER FOR SUPERVISION OF OTHER NORMAL PREGNANCY IN THIRD TRIMESTER: Primary | ICD-10-CM

## 2022-06-02 DIAGNOSIS — Z3A.34 34 WEEKS GESTATION OF PREGNANCY: ICD-10-CM

## 2022-06-02 PROCEDURE — 4004F PT TOBACCO SCREEN RCVD TLK: CPT | Performed by: ADVANCED PRACTICE MIDWIFE

## 2022-06-02 PROCEDURE — G8427 DOCREV CUR MEDS BY ELIG CLIN: HCPCS | Performed by: ADVANCED PRACTICE MIDWIFE

## 2022-06-02 PROCEDURE — 99213 OFFICE O/P EST LOW 20 MIN: CPT | Performed by: ADVANCED PRACTICE MIDWIFE

## 2022-06-02 PROCEDURE — G8419 CALC BMI OUT NRM PARAM NOF/U: HCPCS | Performed by: ADVANCED PRACTICE MIDWIFE

## 2022-06-02 NOTE — PROGRESS NOTES
Alaine Nissen is here at 34w3d for:    Chief Complaint   Patient presents with    Routine Prenatal Visit     C/O sharp pain under breasts and leaking milk. Unable to obtain urine. Estimated Due Date: Estimated Date of Delivery: 22    OB History    Para Term  AB Living   2 1 1     1   SAB IAB Ectopic Molar Multiple Live Births           0 1      # Outcome Date GA Lbr Sorin/2nd Weight Sex Delivery Anes PTL Lv   2 Current            1 Term 20 39w0d 08:49 / 00:07 6 lb 11.6 oz (3.049 kg) F Vag-Spont EPI N GREGORY      Complications: Placental abnormality        Past Medical History:   Diagnosis Date    ADHD        No past surgical history on file. Social History     Tobacco Use   Smoking Status Current Every Day Smoker    Types: Cigarettes   Smokeless Tobacco Never Used        Social History     Substance and Sexual Activity   Alcohol Use No       No results found for this visit on 22. HPI: here for routine ob visit, c/o generalized discomfort     PT denies fever, chills, nausea and vomiting       Vitals:  Estimated body mass index is 35.78 kg/m² as calculated from the following:    Height as of 22: 5' 3\" (1.6 m). Weight as of this encounter: 202 lb (91.6 kg). BP: 122/80  Weight: 202 lb (91.6 kg)  Patient Position: Sitting  Movement: Present           Abdomen: round, soft, nontender    Results reviewed today:    No results found for this visit on 22. ASSESSMENT & Plan    Diagnosis Orders   1. Encounter for supervision of other normal pregnancy in third trimester     2. 34 weeks gestation of pregnancy               I am having Alyssa A. Jessica Blizzard maintain her Prenatal Vitamin, acetaminophen, Prenatal Vitamins, and fluticasone. Return in about 2 weeks (around 2022) for ob and 3 weeks ob with sono for EFW. There are no Patient Instructions on file for this visit.           MOSHE Villasenor CNM,2022 10:48 AM

## 2022-06-14 ENCOUNTER — ROUTINE PRENATAL (OUTPATIENT)
Dept: OBGYN | Age: 22
End: 2022-06-14
Payer: COMMERCIAL

## 2022-06-14 ENCOUNTER — HOSPITAL ENCOUNTER (OUTPATIENT)
Age: 22
Setting detail: SPECIMEN
Discharge: HOME | End: 2022-06-14
Payer: COMMERCIAL

## 2022-06-14 VITALS — BODY MASS INDEX: 36.28 KG/M2 | SYSTOLIC BLOOD PRESSURE: 122 MMHG | DIASTOLIC BLOOD PRESSURE: 86 MMHG | WEIGHT: 204.8 LBS

## 2022-06-14 DIAGNOSIS — R82.90 ABNORMAL URINALYSIS: ICD-10-CM

## 2022-06-14 DIAGNOSIS — Z3A.36 36 WEEKS GESTATION OF PREGNANCY: ICD-10-CM

## 2022-06-14 DIAGNOSIS — Z34.83 ENCOUNTER FOR SUPERVISION OF OTHER NORMAL PREGNANCY IN THIRD TRIMESTER: Primary | ICD-10-CM

## 2022-06-14 PROCEDURE — 99213 OFFICE O/P EST LOW 20 MIN: CPT | Performed by: ADVANCED PRACTICE MIDWIFE

## 2022-06-14 PROCEDURE — G8428 CUR MEDS NOT DOCUMENT: HCPCS | Performed by: ADVANCED PRACTICE MIDWIFE

## 2022-06-14 PROCEDURE — 4004F PT TOBACCO SCREEN RCVD TLK: CPT | Performed by: ADVANCED PRACTICE MIDWIFE

## 2022-06-14 PROCEDURE — 87081 CULTURE SCREEN ONLY: CPT

## 2022-06-14 PROCEDURE — G8419 CALC BMI OUT NRM PARAM NOF/U: HCPCS | Performed by: ADVANCED PRACTICE MIDWIFE

## 2022-06-14 NOTE — PROGRESS NOTES
Phuc Funes is here at 36w1d for:    Chief Complaint   Patient presents with    Routine Prenatal Visit     GBS today. Feeling good. +leukocytes otherwise normal urine. Estimated Due Date: Estimated Date of Delivery: 22    OB History    Para Term  AB Living   2 1 1     1   SAB IAB Ectopic Molar Multiple Live Births           0 1      # Outcome Date GA Lbr Sorin/2nd Weight Sex Delivery Anes PTL Lv   2 Current            1 Term 20 39w0d 08:49 / 00:07 6 lb 11.6 oz (3.049 kg) F Vag-Spont EPI N GREGORY      Complications: Placental abnormality        Past Medical History:   Diagnosis Date    ADHD        No past surgical history on file. Social History     Tobacco Use   Smoking Status Current Every Day Smoker    Types: Cigarettes   Smokeless Tobacco Never Used        Social History     Substance and Sexual Activity   Alcohol Use No       No results found for this visit on 22. HPI: here for routine ob visit, desires IOL at 39 weeks     PT denies fever, chills, nausea and vomiting       Vitals:  Estimated body mass index is 36.28 kg/m² as calculated from the following:    Height as of 22: 5' 3\" (1.6 m). Weight as of this encounter: 204 lb 12.8 oz (92.9 kg). BP: 122/86  Weight: 204 lb 12.8 oz (92.9 kg)  Patient Position: Sitting  Albumin: Negative  Glucose: Negative  Fundal Height (cm): 35 cm  Fetal Heart Rate: 148  Movement: Present  Presentation: Vertex  Dilation (cm): 2  Effacement: 70  Station: -2           Abdomen: round, soft, nontender  genital molluscum vs skin tags ( \"had for a long time\")    Results reviewed today:    No results found for this visit on 22. ASSESSMENT & Plan    Diagnosis Orders   1. Encounter for supervision of other normal pregnancy in third trimester     2. Abnormal urinalysis  Culture, Urine   3. 36 weeks gestation of pregnancy  Culture, Strep B Screen, Vaginal/Rectal             Yoly SRINIVASA Lewis does not currently have medications on file. Return in about 1 week (around 6/21/2022) for ob w Dr. Khoi Simon or Jong Griffith. There are no Patient Instructions on file for this visit.           MOSHE Lewis CNM,6/14/2022 10:22 AM

## 2022-06-17 LAB
CULTURE: NORMAL
SPECIMEN DESCRIPTION: NORMAL

## 2022-06-20 ENCOUNTER — HOSPITAL ENCOUNTER (OUTPATIENT)
Age: 22
Discharge: HOME | End: 2022-06-20
Attending: ADVANCED PRACTICE MIDWIFE | Admitting: ADVANCED PRACTICE MIDWIFE
Payer: COMMERCIAL

## 2022-06-20 VITALS
BODY MASS INDEX: 35.79 KG/M2 | HEART RATE: 80 BPM | TEMPERATURE: 97.5 F | SYSTOLIC BLOOD PRESSURE: 120 MMHG | HEIGHT: 63 IN | DIASTOLIC BLOOD PRESSURE: 71 MMHG | WEIGHT: 202 LBS | RESPIRATION RATE: 18 BRPM

## 2022-06-20 LAB
-: ABNORMAL
AMORPHOUS: ABNORMAL
BACTERIA: ABNORMAL
BILIRUBIN URINE: NEGATIVE
COLOR: YELLOW
EPITHELIAL CELLS UA: ABNORMAL /HPF (ref 0–25)
GLUCOSE URINE: NEGATIVE
KETONES, URINE: NEGATIVE
LEUKOCYTE ESTERASE, URINE: ABNORMAL
NITRITE, URINE: NEGATIVE
PH UA: 8 (ref 5–9)
PROTEIN UA: NEGATIVE
RBC UA: ABNORMAL /HPF (ref 0–2)
SPECIFIC GRAVITY UA: 1.01 (ref 1.01–1.02)
TURBIDITY: ABNORMAL
URINE HGB: NEGATIVE
UROBILINOGEN, URINE: NORMAL
WBC UA: ABNORMAL /HPF (ref 0–5)

## 2022-06-20 PROCEDURE — 81001 URINALYSIS AUTO W/SCOPE: CPT

## 2022-06-20 PROCEDURE — 59025 FETAL NON-STRESS TEST: CPT

## 2022-06-20 PROCEDURE — 6370000000 HC RX 637 (ALT 250 FOR IP): Performed by: ADVANCED PRACTICE MIDWIFE

## 2022-06-20 PROCEDURE — 99212 OFFICE O/P EST SF 10 MIN: CPT

## 2022-06-20 RX ORDER — CEPHALEXIN 500 MG/1
500 CAPSULE ORAL ONCE
Status: COMPLETED | OUTPATIENT
Start: 2022-06-20 | End: 2022-06-20

## 2022-06-20 RX ADMIN — CEPHALEXIN 500 MG: 500 CAPSULE ORAL at 13:23

## 2022-06-20 NOTE — FLOWSHEET NOTE
Juan Carlos Bentley CNM called et notified of pt co. Notified of pt hx, SVE, U/A, irritabilities, reactive NST. Pt states is not feeling ctx anymore. Orders received. Pt may be discharged at 1330 if cervix is unchanged and pt not feeling ctx. Order for keflex 500 mg TID to be called to pharmacy.

## 2022-06-20 NOTE — FLOWSHEET NOTE
Pt unchanged. Notified that prescription will be called in for Jhoan to [facility]. Verb understanding of this and when to return to hospital for labor. Discharge instructions reviewed.

## 2022-06-20 NOTE — PROGRESS NOTES
Patient presents to L&D today for patient reassurance, fetal well being. IUP at 37+0 weeks     NST is reactive. Quality of tracing is satisfactory.

## 2022-06-20 NOTE — FLOWSHEET NOTE
Pt to ob unit from home. States has been having contractions since this morning. They are getting closer together and more uncomfortable. States most of her pain is in right lower abdomen. Denies leaking fluid or vag bleeding.

## 2022-06-22 ENCOUNTER — ROUTINE PRENATAL (OUTPATIENT)
Dept: OBGYN | Age: 22
End: 2022-06-22
Payer: COMMERCIAL

## 2022-06-22 ENCOUNTER — ANCILLARY PROCEDURE (OUTPATIENT)
Dept: OBGYN | Age: 22
DRG: 560 | End: 2022-06-22
Payer: COMMERCIAL

## 2022-06-22 VITALS — BODY MASS INDEX: 36.67 KG/M2 | WEIGHT: 207 LBS | DIASTOLIC BLOOD PRESSURE: 84 MMHG | SYSTOLIC BLOOD PRESSURE: 124 MMHG

## 2022-06-22 DIAGNOSIS — Z34.83 ENCOUNTER FOR SUPERVISION OF OTHER NORMAL PREGNANCY IN THIRD TRIMESTER: ICD-10-CM

## 2022-06-22 DIAGNOSIS — O41.03X1 OLIGOHYDRAMNIOS ANTEPARTUM, THIRD TRIMESTER, FETUS 1: ICD-10-CM

## 2022-06-22 DIAGNOSIS — Z36.89 ENCOUNTER FOR ULTRASOUND TO ASSESS FETAL GROWTH: ICD-10-CM

## 2022-06-22 DIAGNOSIS — Z3A.37 37 WEEKS GESTATION OF PREGNANCY: ICD-10-CM

## 2022-06-22 DIAGNOSIS — Z3A.37 37 WEEKS GESTATION OF PREGNANCY: Primary | ICD-10-CM

## 2022-06-22 PROCEDURE — 4004F PT TOBACCO SCREEN RCVD TLK: CPT | Performed by: ADVANCED PRACTICE MIDWIFE

## 2022-06-22 PROCEDURE — G8419 CALC BMI OUT NRM PARAM NOF/U: HCPCS | Performed by: ADVANCED PRACTICE MIDWIFE

## 2022-06-22 PROCEDURE — 76815 OB US LIMITED FETUS(S): CPT | Performed by: OBSTETRICS & GYNECOLOGY

## 2022-06-22 PROCEDURE — G8428 CUR MEDS NOT DOCUMENT: HCPCS | Performed by: ADVANCED PRACTICE MIDWIFE

## 2022-06-22 PROCEDURE — 99213 OFFICE O/P EST LOW 20 MIN: CPT | Performed by: ADVANCED PRACTICE MIDWIFE

## 2022-06-22 RX ORDER — CEPHALEXIN 500 MG/1
CAPSULE ORAL
Status: ON HOLD | COMMUNITY
Start: 2022-06-20 | End: 2022-07-04 | Stop reason: HOSPADM

## 2022-06-22 NOTE — PROGRESS NOTES
Petersrinivasan Nahun is here at 37w2d for:    Chief Complaint   Patient presents with    Routine Prenatal Visit     Off and on irregular contractions. follow up in house ultrasound. Unable to obtain urine. Currently taking Keflex for UTI. Estimated Due Date: Estimated Date of Delivery: 22    OB History    Para Term  AB Living   2 1 1     1   SAB IAB Ectopic Molar Multiple Live Births           0 1      # Outcome Date GA Lbr Sorin/2nd Weight Sex Delivery Anes PTL Lv   2 Current            1 Term 20 39w0d 08:49 / 00:07 6 lb 11.6 oz (3.049 kg) F Vag-Spont EPI N GREGORY      Complications: Placental abnormality        Past Medical History:   Diagnosis Date    ADHD        No past surgical history on file. Social History     Tobacco Use   Smoking Status Current Every Day Smoker    Types: Cigarettes   Smokeless Tobacco Never Used        Social History     Substance and Sexual Activity   Alcohol Use No       No results found for this visit on 22. HPI: Here today for OB visit. Patient states Margaret Barrios is worried about the baby getting too big. Yes PT denies fever, chills, nausea and vomiting       Vitals:  Estimated body mass index is 36.67 kg/m² as calculated from the following:    Height as of 22: 5' 3\" (1.6 m). Weight as of this encounter: 207 lb (93.9 kg). BP: 124/84  Weight: 207 lb (93.9 kg)  Patient Position: Sitting  Movement: Present           Abdomen: Soft nontender    Results reviewed today:    2022  3:23 PM      37.5 WK IUP  EFW:58% (7lb 1oz)   SHIV:6.1cm, oligohydramnios   HR:142bpm  posterior placenta, cephalic presentation  Active fetal movements            ASSESSMENT & Plan    Diagnosis Orders   1. 37 weeks gestation of pregnancy     2. Encounter for supervision of other normal pregnancy in third trimester     3. Oligohydramnios antepartum, third trimester, fetus 1               I am having Yoly Lane maintain her cephALEXin.     Return in about 5 days (around 6/27/2022) for u/s ruddy. Patient Instructions     Patient Education        Week 37 of Your Pregnancy: Care Instructions  Overview     You are near the end of your pregnancy--and you're probably pretty uncomfortable. It may be harder to walk around. Lying down probably isn'tcomfortable either. You may have trouble getting to sleep or staying asleep. Most babies are born between 40 and 41 weeks. This is a good time to think about packing a bag for the hospital with items you'll need. Then you'll beready when labor starts. Follow-up care is a key part of your treatment and safety. Be sure to make and go to all appointments, and call your doctor if you are having problems. It's also a good idea to know your test results and keep alist of the medicines you take. How can you care for yourself at home? Learn about breastfeeding   Breastfeeding is best for your baby and good for you.  Breast milk has antibodies to help your baby fight infections.  If you breastfeed, you may lose weight faster. That's because making milk burns calories.  Learning the best ways to hold your baby will make breastfeeding easier.  Sometimes breastfeeding can make partners feel left out. If you have a partner, plan how you can care for your baby together. For example, your partner can bathe and diaper the baby. You can snuggle together when you breastfeed.  You may want to learn how to use a breast pump and store your milk.  If you choose to bottle feed, make the feeding feel like breastfeeding so you can bond with your baby. Always hold your baby and the bottle. Don't prop bottles or let your baby fall asleep with a bottle. Learn about crying   It's common for babies to cry for 1 to 3 hours a day. Some cry more, and some cry less.  Babies don't cry to make you upset or because you're a bad parent.  Crying is how your baby communicates.  Your baby may be hungry; have gas; need a diaper change; or feel cold, warm, tired, lonely, or tense. Sometimes babies cry for unknown reasons.  If you respond to your baby's needs, your baby will learn to trust you.  Try to stay calm when your baby cries. Your baby may get more upset if they sense that you are upset. Know how to care for your    Your baby's umbilical cord stump will drop off on its own, usually between 1 and 2 weeks. To care for your baby's umbilical cord area:  ? Clean the area at the bottom of the cord 2 or 3 times a day. ? Pay special attention to the area where the cord attaches to the skin. ? Keep the diaper folded below the cord. ? Use a damp washcloth or cotton ball to sponge bathe your baby until the stump has come off.  Your baby's first dark stool is called meconium. After the meconium is passed, your baby will develop their own bowel pattern. ? Some babies, especially  babies, have several bowel movements a day. Others have one or two a day, or one every 2 to 3 days. ?  babies often have loose, yellow stools. Formula-fed babies have more formed stools. ? If your baby's stools look like little pellets, your baby is constipated. After 2 days of constipation, call your baby's doctor.  If your baby will be circumcised, you can care for your baby at home. ? Gently rinse your baby's penis with warm water after every diaper change. Don't try to remove the film that forms on the penis. This film will go away on its own. Pat dry. ? Put petroleum ointment, such as Vaseline, on the area of the diaper that will touch your baby's penis. This will keep the diaper from sticking to your baby. ? Ask the doctor about giving your baby acetaminophen (Tylenol) for pain. Where can you learn more? Go to https://Open Range Communicationsdagobertoeb.NebuAd. org and sign in to your WatchParty account. Enter 91  28 in the [place] box to learn more about \"Week 37 of Your Pregnancy: Care Instructions. \"     If you do not have an account, please click on the \"Sign Up Now\" link. Current as of: February 23, 2022               Content Version: 13.3  © 2006-2022 Healthwise, Incorporated. Care instructions adapted under license by [facility] ([facility]). If you have questions about a medical condition or this instruction, always ask your healthcare professional. [place] [address] any warranty or liability for your use of this information.                    Lucian Ny, MOSHE - CNM,6/22/2022 4:09 PM [phone]

## 2022-06-22 NOTE — PATIENT INSTRUCTIONS
reasons.  If you respond to your baby's needs, your baby will learn to trust you.  Try to stay calm when your baby cries. Your baby may get more upset if they sense that you are upset. Know how to care for your    Your baby's umbilical cord stump will drop off on its own, usually between 1 and 2 weeks. To care for your baby's umbilical cord area:  ? Clean the area at the bottom of the cord 2 or 3 times a day. ? Pay special attention to the area where the cord attaches to the skin. ? Keep the diaper folded below the cord. ? Use a damp washcloth or cotton ball to sponge bathe your baby until the stump has come off.  Your baby's first dark stool is called meconium. After the meconium is passed, your baby will develop their own bowel pattern. ? Some babies, especially  babies, have several bowel movements a day. Others have one or two a day, or one every 2 to 3 days. ?  babies often have loose, yellow stools. Formula-fed babies have more formed stools. ? If your baby's stools look like little pellets, your baby is constipated. After 2 days of constipation, call your baby's doctor.  If your baby will be circumcised, you can care for your baby at home. ? Gently rinse your baby's penis with warm water after every diaper change. Don't try to remove the film that forms on the penis. This film will go away on its own. Pat dry. ? Put petroleum ointment, such as Vaseline, on the area of the diaper that will touch your baby's penis. This will keep the diaper from sticking to your baby. ? Ask the doctor about giving your baby acetaminophen (Tylenol) for pain. Where can you learn more? Go to https://chminooewelena.Radial Network. org and sign in to your [employer] account. Enter  97 in the [employer] box to learn more about \"Week 37 of Your Pregnancy: Care Instructions. \"     If you do not have an account, please click on the \"Sign Up Now\" link.   Current as of:

## 2022-06-27 ENCOUNTER — ANCILLARY PROCEDURE (OUTPATIENT)
Dept: OBGYN | Age: 22
DRG: 560 | End: 2022-06-27
Attending: ADVANCED PRACTICE MIDWIFE
Payer: COMMERCIAL

## 2022-06-27 ENCOUNTER — ROUTINE PRENATAL (OUTPATIENT)
Dept: OBGYN | Age: 22
End: 2022-06-27
Payer: COMMERCIAL

## 2022-06-27 VITALS — SYSTOLIC BLOOD PRESSURE: 136 MMHG | DIASTOLIC BLOOD PRESSURE: 80 MMHG | WEIGHT: 206 LBS | BODY MASS INDEX: 36.49 KG/M2

## 2022-06-27 DIAGNOSIS — O41.00X0 OLIGOHYDRAMNIOS, ANTEPARTUM, SINGLE OR UNSPECIFIED FETUS: ICD-10-CM

## 2022-06-27 DIAGNOSIS — Z34.83 ENCOUNTER FOR SUPERVISION OF OTHER NORMAL PREGNANCY IN THIRD TRIMESTER: Primary | ICD-10-CM

## 2022-06-27 DIAGNOSIS — Z3A.38 38 WEEKS GESTATION OF PREGNANCY: ICD-10-CM

## 2022-06-27 PROCEDURE — G8427 DOCREV CUR MEDS BY ELIG CLIN: HCPCS | Performed by: ADVANCED PRACTICE MIDWIFE

## 2022-06-27 PROCEDURE — 4004F PT TOBACCO SCREEN RCVD TLK: CPT | Performed by: ADVANCED PRACTICE MIDWIFE

## 2022-06-27 PROCEDURE — 99213 OFFICE O/P EST LOW 20 MIN: CPT | Performed by: ADVANCED PRACTICE MIDWIFE

## 2022-06-27 PROCEDURE — 76819 FETAL BIOPHYS PROFIL W/O NST: CPT | Performed by: OBSTETRICS & GYNECOLOGY

## 2022-06-27 PROCEDURE — G8419 CALC BMI OUT NRM PARAM NOF/U: HCPCS | Performed by: ADVANCED PRACTICE MIDWIFE

## 2022-07-02 ENCOUNTER — HOSPITAL ENCOUNTER (OUTPATIENT)
Age: 22
Discharge: HOME | DRG: 560 | End: 2022-07-02
Attending: OBSTETRICS & GYNECOLOGY | Admitting: OBSTETRICS & GYNECOLOGY
Payer: COMMERCIAL

## 2022-07-02 VITALS
RESPIRATION RATE: 18 BRPM | TEMPERATURE: 98.2 F | DIASTOLIC BLOOD PRESSURE: 74 MMHG | HEART RATE: 107 BPM | SYSTOLIC BLOOD PRESSURE: 132 MMHG

## 2022-07-02 LAB
-: NORMAL
BILIRUBIN URINE: NEGATIVE
COLOR: YELLOW
EPITHELIAL CELLS UA: NORMAL /HPF (ref 0–25)
GLUCOSE BLD-MCNC: 111 MG/DL (ref 74–100)
GLUCOSE URINE: ABNORMAL
KETONES, URINE: NEGATIVE
LEUKOCYTE ESTERASE, URINE: NEGATIVE
NITRITE, URINE: NEGATIVE
PH UA: 6 (ref 5–9)
PROTEIN UA: NEGATIVE
RBC UA: NORMAL /HPF (ref 0–2)
SPECIFIC GRAVITY UA: >1.03 (ref 1.01–1.02)
TURBIDITY: CLEAR
URINE HGB: NEGATIVE
UROBILINOGEN, URINE: NORMAL
WBC UA: NORMAL /HPF (ref 0–5)

## 2022-07-02 PROCEDURE — 82947 ASSAY GLUCOSE BLOOD QUANT: CPT

## 2022-07-02 PROCEDURE — 81001 URINALYSIS AUTO W/SCOPE: CPT

## 2022-07-03 ENCOUNTER — ANESTHESIA (OUTPATIENT)
Dept: LABOR AND DELIVERY | Age: 22
DRG: 560 | End: 2022-07-03
Payer: COMMERCIAL

## 2022-07-03 ENCOUNTER — HOSPITAL ENCOUNTER (INPATIENT)
Age: 22
LOS: 1 days | Discharge: HOME | DRG: 560 | End: 2022-07-04
Attending: OBSTETRICS & GYNECOLOGY | Admitting: ADVANCED PRACTICE MIDWIFE
Payer: COMMERCIAL

## 2022-07-03 ENCOUNTER — ANESTHESIA EVENT (OUTPATIENT)
Dept: LABOR AND DELIVERY | Age: 22
DRG: 560 | End: 2022-07-03
Payer: COMMERCIAL

## 2022-07-03 LAB
-: ABNORMAL
ABSOLUTE EOS #: 0.11 K/UL (ref 0–0.44)
ABSOLUTE IMMATURE GRANULOCYTE: 0.13 K/UL (ref 0–0.3)
ABSOLUTE LYMPH #: 2.31 K/UL (ref 1.2–5.2)
ABSOLUTE MONO #: 1.26 K/UL (ref 0.1–1.4)
AMPHETAMINE SCREEN URINE: NEGATIVE
BACTERIA: ABNORMAL
BARBITURATE SCREEN URINE: NEGATIVE
BASOPHILS # BLD: 0 % (ref 0–2)
BASOPHILS ABSOLUTE: 0.03 K/UL (ref 0–0.2)
BENZODIAZEPINE SCREEN, URINE: NEGATIVE
BILIRUBIN URINE: NEGATIVE
BUPRENORPHINE URINE: NEGATIVE
CANNABINOID SCREEN URINE: NEGATIVE
COCAINE METABOLITE, URINE: NEGATIVE
COLOR: YELLOW
EOSINOPHILS RELATIVE PERCENT: 1 % (ref 1–4)
EPITHELIAL CELLS UA: ABNORMAL /HPF (ref 0–25)
GLUCOSE URINE: NEGATIVE
HCT VFR BLD CALC: 40.1 % (ref 36.3–47.1)
HEMOGLOBIN: 12.8 G/DL (ref 11.9–15.1)
IMMATURE GRANULOCYTES: 1 %
KETONES, URINE: NEGATIVE
LEUKOCYTE ESTERASE, URINE: ABNORMAL
LYMPHOCYTES # BLD: 17 % (ref 25–45)
MCH RBC QN AUTO: 28.5 PG (ref 25.2–33.5)
MCHC RBC AUTO-ENTMCNC: 31.9 G/DL (ref 28.4–34.8)
MCV RBC AUTO: 89.3 FL (ref 82.6–102.9)
METHADONE SCREEN, URINE: NEGATIVE
METHAMPHETAMINE, URINE: NEGATIVE
MONOCYTES # BLD: 9 % (ref 2–8)
NITRITE, URINE: POSITIVE
NRBC AUTOMATED: 0 PER 100 WBC
OPIATES, URINE: NEGATIVE
OXYCODONE SCREEN URINE: NEGATIVE
PDW BLD-RTO: 14 % (ref 11.8–14.4)
PH UA: 6 (ref 5–9)
PHENCYCLIDINE, URINE: NEGATIVE
PLATELET # BLD: 225 K/UL (ref 138–453)
PMV BLD AUTO: 10.6 FL (ref 8.1–13.5)
PROPOXYPHENE, URINE: NEGATIVE
PROTEIN UA: ABNORMAL
RBC # BLD: 4.49 M/UL (ref 3.95–5.11)
RBC UA: ABNORMAL /HPF (ref 0–2)
SEG NEUTROPHILS: 72 % (ref 34–64)
SEGMENTED NEUTROPHILS ABSOLUTE COUNT: 9.94 K/UL (ref 1.8–8)
SPECIFIC GRAVITY UA: >1.03 (ref 1.01–1.02)
TRICYCLIC ANTIDEPRESSANTS, UR: NEGATIVE
TURBIDITY: CLEAR
URINE HGB: ABNORMAL
UROBILINOGEN, URINE: NORMAL
WBC # BLD: 13.8 K/UL (ref 4.5–13.5)
WBC UA: ABNORMAL /HPF (ref 0–5)

## 2022-07-03 PROCEDURE — 6360000002 HC RX W HCPCS: Performed by: NURSE ANESTHETIST, CERTIFIED REGISTERED

## 2022-07-03 PROCEDURE — 88307 TISSUE EXAM BY PATHOLOGIST: CPT

## 2022-07-03 PROCEDURE — 7200000001 HC VAGINAL DELIVERY

## 2022-07-03 PROCEDURE — 80306 DRUG TEST PRSMV INSTRMNT: CPT

## 2022-07-03 PROCEDURE — 6360000002 HC RX W HCPCS

## 2022-07-03 PROCEDURE — 81001 URINALYSIS AUTO W/SCOPE: CPT

## 2022-07-03 PROCEDURE — 10907ZC DRAINAGE OF AMNIOTIC FLUID, THERAPEUTIC FROM PRODUCTS OF CONCEPTION, VIA NATURAL OR ARTIFICIAL OPENING: ICD-10-PCS | Performed by: NURSE PRACTITIONER

## 2022-07-03 PROCEDURE — 1220000000 HC SEMI PRIVATE OB R&B

## 2022-07-03 PROCEDURE — 3700000025 EPIDURAL BLOCK: Performed by: NURSE ANESTHETIST, CERTIFIED REGISTERED

## 2022-07-03 PROCEDURE — 59409 OBSTETRICAL CARE: CPT | Performed by: ADVANCED PRACTICE MIDWIFE

## 2022-07-03 PROCEDURE — 85025 COMPLETE CBC W/AUTO DIFF WBC: CPT

## 2022-07-03 PROCEDURE — 6370000000 HC RX 637 (ALT 250 FOR IP): Performed by: OBSTETRICS & GYNECOLOGY

## 2022-07-03 PROCEDURE — 87086 URINE CULTURE/COLONY COUNT: CPT

## 2022-07-03 PROCEDURE — 6370000000 HC RX 637 (ALT 250 FOR IP): Performed by: ADVANCED PRACTICE MIDWIFE

## 2022-07-03 PROCEDURE — 2580000003 HC RX 258: Performed by: ADVANCED PRACTICE MIDWIFE

## 2022-07-03 RX ORDER — ONDANSETRON 2 MG/ML
4 INJECTION INTRAMUSCULAR; INTRAVENOUS EVERY 6 HOURS PRN
Status: DISCONTINUED | OUTPATIENT
Start: 2022-07-03 | End: 2022-07-03

## 2022-07-03 RX ORDER — SODIUM CHLORIDE 0.9 % (FLUSH) 0.9 %
5-40 SYRINGE (ML) INJECTION PRN
Status: DISCONTINUED | OUTPATIENT
Start: 2022-07-03 | End: 2022-07-03

## 2022-07-03 RX ORDER — EPHEDRINE SULFATE/0.9% NACL/PF 50 MG/5 ML
5 SYRINGE (ML) INTRAVENOUS EVERY 5 MIN PRN
Status: DISCONTINUED | OUTPATIENT
Start: 2022-07-03 | End: 2022-07-03

## 2022-07-03 RX ORDER — NALBUPHINE HCL 10 MG/ML
10 AMPUL (ML) INJECTION
Status: DISCONTINUED | OUTPATIENT
Start: 2022-07-03 | End: 2022-07-03

## 2022-07-03 RX ORDER — IBUPROFEN 800 MG/1
800 TABLET ORAL EVERY 8 HOURS PRN
Status: DISCONTINUED | OUTPATIENT
Start: 2022-07-03 | End: 2022-07-04 | Stop reason: HOSPADM

## 2022-07-03 RX ORDER — SODIUM CHLORIDE 9 MG/ML
INJECTION, SOLUTION INTRAVENOUS PRN
Status: DISCONTINUED | OUTPATIENT
Start: 2022-07-03 | End: 2022-07-04 | Stop reason: HOSPADM

## 2022-07-03 RX ORDER — MISOPROSTOL 100 UG/1
900 TABLET ORAL PRN
Status: DISCONTINUED | OUTPATIENT
Start: 2022-07-03 | End: 2022-07-03

## 2022-07-03 RX ORDER — ROPIVACAINE HYDROCHLORIDE 2 MG/ML
INJECTION, SOLUTION EPIDURAL; INFILTRATION; PERINEURAL CONTINUOUS PRN
Status: DISCONTINUED | OUTPATIENT
Start: 2022-07-03 | End: 2022-07-03 | Stop reason: SDUPTHER

## 2022-07-03 RX ORDER — SODIUM CHLORIDE 0.9 % (FLUSH) 0.9 %
5-40 SYRINGE (ML) INJECTION EVERY 12 HOURS SCHEDULED
Status: DISCONTINUED | OUTPATIENT
Start: 2022-07-03 | End: 2022-07-04 | Stop reason: HOSPADM

## 2022-07-03 RX ORDER — ACETAMINOPHEN 325 MG/1
650 TABLET ORAL EVERY 4 HOURS PRN
Status: DISCONTINUED | OUTPATIENT
Start: 2022-07-03 | End: 2022-07-03

## 2022-07-03 RX ORDER — ACETAMINOPHEN 500 MG
1000 TABLET ORAL EVERY 8 HOURS PRN
Status: DISCONTINUED | OUTPATIENT
Start: 2022-07-03 | End: 2022-07-04 | Stop reason: HOSPADM

## 2022-07-03 RX ORDER — LIDOCAINE HYDROCHLORIDE 10 MG/ML
30 INJECTION, SOLUTION EPIDURAL; INFILTRATION; INTRACAUDAL; PERINEURAL PRN
Status: DISCONTINUED | OUTPATIENT
Start: 2022-07-03 | End: 2022-07-03

## 2022-07-03 RX ORDER — ROPIVACAINE HYDROCHLORIDE 2 MG/ML
10 INJECTION, SOLUTION EPIDURAL; INFILTRATION; PERINEURAL CONTINUOUS
Status: DISCONTINUED | OUTPATIENT
Start: 2022-07-03 | End: 2022-07-03

## 2022-07-03 RX ORDER — CARBOPROST TROMETHAMINE 250 UG/ML
250 INJECTION, SOLUTION INTRAMUSCULAR PRN
Status: DISCONTINUED | OUTPATIENT
Start: 2022-07-03 | End: 2022-07-04 | Stop reason: HOSPADM

## 2022-07-03 RX ORDER — MISOPROSTOL 100 UG/1
800 TABLET ORAL PRN
Status: DISCONTINUED | OUTPATIENT
Start: 2022-07-03 | End: 2022-07-04 | Stop reason: HOSPADM

## 2022-07-03 RX ORDER — METHYLERGONOVINE MALEATE 0.2 MG/ML
200 INJECTION INTRAVENOUS PRN
Status: DISCONTINUED | OUTPATIENT
Start: 2022-07-03 | End: 2022-07-04 | Stop reason: HOSPADM

## 2022-07-03 RX ORDER — SODIUM CHLORIDE 9 MG/ML
25 INJECTION, SOLUTION INTRAVENOUS PRN
Status: DISCONTINUED | OUTPATIENT
Start: 2022-07-03 | End: 2022-07-03

## 2022-07-03 RX ORDER — MODIFIED LANOLIN
OINTMENT (GRAM) TOPICAL PRN
Status: DISCONTINUED | OUTPATIENT
Start: 2022-07-03 | End: 2022-07-04 | Stop reason: HOSPADM

## 2022-07-03 RX ORDER — SODIUM CHLORIDE 0.9 % (FLUSH) 0.9 %
5-40 SYRINGE (ML) INJECTION EVERY 12 HOURS SCHEDULED
Status: DISCONTINUED | OUTPATIENT
Start: 2022-07-03 | End: 2022-07-03

## 2022-07-03 RX ORDER — SODIUM CHLORIDE, SODIUM LACTATE, POTASSIUM CHLORIDE, AND CALCIUM CHLORIDE .6; .31; .03; .02 G/100ML; G/100ML; G/100ML; G/100ML
1000 INJECTION, SOLUTION INTRAVENOUS PRN
Status: DISCONTINUED | OUTPATIENT
Start: 2022-07-03 | End: 2022-07-03

## 2022-07-03 RX ORDER — METHYLERGONOVINE MALEATE 0.2 MG/1
200 TABLET ORAL 3 TIMES DAILY
Status: DISCONTINUED | OUTPATIENT
Start: 2022-07-03 | End: 2022-07-04 | Stop reason: HOSPADM

## 2022-07-03 RX ORDER — SODIUM CHLORIDE, SODIUM LACTATE, POTASSIUM CHLORIDE, AND CALCIUM CHLORIDE .6; .31; .03; .02 G/100ML; G/100ML; G/100ML; G/100ML
500 INJECTION, SOLUTION INTRAVENOUS PRN
Status: DISCONTINUED | OUTPATIENT
Start: 2022-07-03 | End: 2022-07-03

## 2022-07-03 RX ORDER — SEVOFLURANE 250 ML/250ML
1 LIQUID RESPIRATORY (INHALATION) CONTINUOUS PRN
Status: DISCONTINUED | OUTPATIENT
Start: 2022-07-03 | End: 2022-07-03

## 2022-07-03 RX ORDER — SODIUM CHLORIDE 0.9 % (FLUSH) 0.9 %
5-40 SYRINGE (ML) INJECTION PRN
Status: DISCONTINUED | OUTPATIENT
Start: 2022-07-03 | End: 2022-07-04 | Stop reason: HOSPADM

## 2022-07-03 RX ORDER — SODIUM CHLORIDE, SODIUM LACTATE, POTASSIUM CHLORIDE, CALCIUM CHLORIDE 600; 310; 30; 20 MG/100ML; MG/100ML; MG/100ML; MG/100ML
INJECTION, SOLUTION INTRAVENOUS CONTINUOUS
Status: DISCONTINUED | OUTPATIENT
Start: 2022-07-03 | End: 2022-07-03

## 2022-07-03 RX ORDER — DOCUSATE SODIUM 100 MG/1
100 CAPSULE, LIQUID FILLED ORAL 2 TIMES DAILY PRN
Status: DISCONTINUED | OUTPATIENT
Start: 2022-07-03 | End: 2022-07-04 | Stop reason: HOSPADM

## 2022-07-03 RX ADMIN — ROPIVACAINE HYDROCHLORIDE 10 ML/HR: 2 INJECTION, SOLUTION EPIDURAL; INFILTRATION at 10:16

## 2022-07-03 RX ADMIN — Medication 1 MILLI-UNITS/MIN: at 09:35

## 2022-07-03 RX ADMIN — IBUPROFEN 800 MG: 800 TABLET, FILM COATED ORAL at 13:59

## 2022-07-03 RX ADMIN — IBUPROFEN 800 MG: 800 TABLET, FILM COATED ORAL at 21:39

## 2022-07-03 RX ADMIN — SODIUM CHLORIDE, POTASSIUM CHLORIDE, SODIUM LACTATE AND CALCIUM CHLORIDE 1000 ML: 600; 310; 30; 20 INJECTION, SOLUTION INTRAVENOUS at 05:17

## 2022-07-03 RX ADMIN — Medication 999 ML/HR: at 10:36

## 2022-07-03 RX ADMIN — ROPIVACAINE HYDROCHLORIDE 10 ML/HR: 2 INJECTION, SOLUTION EPIDURAL; INFILTRATION at 06:40

## 2022-07-03 RX ADMIN — METHYLERGONOVINE MALEATE 200 MCG: 0.2 TABLET ORAL at 19:13

## 2022-07-03 ASSESSMENT — PAIN DESCRIPTION - LOCATION: LOCATION: ABDOMEN;HEAD

## 2022-07-03 ASSESSMENT — PAIN SCALES - GENERAL
PAINLEVEL_OUTOF10: 3
PAINLEVEL_OUTOF10: 4

## 2022-07-03 ASSESSMENT — PAIN DESCRIPTION - DESCRIPTORS: DESCRIPTORS: CRAMPING

## 2022-07-03 NOTE — ANESTHESIA PRE PROCEDURE
[facility], APRN - CNM        methylergonovine (METHERGINE) injection 200 mcg  200 mcg IntraMUSCular PRN Kam Yvonne, APRN - CNM        carboprost (HEMABATE) injection 250 mcg  250 mcg IntraMUSCular PRN [place] Yvonne, APRN - CNM        miSOPROStol (CYTOTEC) tablet 900 mcg  900 mcg Rectal PRN Kam Yvonne, APRN - CNM        acetaminophen (TYLENOL) tablet 650 mg  650 mg Oral Q4H PRN Kam Yvonne, APRN - CNM        witch hazel-glycerin (TUCKS) pad   Topical PRN [place] Yvonne, APRN - CNM        benzocaine-menthol (DERMOPLAST) 20-0.5 % spray   Topical PRN [place] Yvonne, APRN - CNM           Allergies:  No Known Allergies    Problem List:    Patient Active Problem List   Diagnosis Code    Normal delivery O80    Vaginal bleeding in pregnancy, second trimester O46.92    Vaginal bleeding during pregnancy, antepartum O46.90    Normal labor and delivery O80       Past Medical History:        Diagnosis Date    ADHD        Past Surgical History:  No past surgical history on file. Social History:    Social History     Tobacco Use    Smoking status: Current Every Day Smoker     Types: Cigarettes    Smokeless tobacco: Never Used   Substance Use Topics    Alcohol use: No                                Ready to quit: Not Answered  Counseling given: Not Answered      Vital Signs (Current):   Vitals:    07/03/22 0444   BP: 130/70   Pulse: 75   Resp: 20   Temp: 36.6 °C (97.9 °F)   TempSrc: Oral                                              BP Readings from Last 3 Encounters:   07/03/22 130/70   07/02/22 132/74   06/27/22 136/80       NPO Status:                                                                                 BMI:   Wt Readings from Last 3 Encounters:   06/27/22 206 lb (93.4 kg) (98 %, Z= 2.03)*   06/22/22 207 lb (93.9 kg) (98 %, Z= 2.04)*   06/20/22 202 lb (91.6 kg) (98 %, Z= 1.97)*     * Growth percentiles are based on CDC (Girls, 2-20 Years) data.      There is no height or weight on file to calculate BMI.    CBC:   Lab Results   Component Value Date/Time    WBC 13.8 07/03/2022 05:30 AM    RBC 4.49 07/03/2022 05:30 AM    HGB 12.8 07/03/2022 05:30 AM    HCT 40.1 07/03/2022 05:30 AM    MCV 89.3 07/03/2022 05:30 AM    RDW 14.0 07/03/2022 05:30 AM     07/03/2022 05:30 AM       CMP:   Lab Results   Component Value Date/Time     02/26/2020 10:25 PM    K 3.5 02/26/2020 10:25 PM    CL 99 02/26/2020 10:25 PM    CO2 24 02/26/2020 10:25 PM    BUN 5 02/26/2020 10:25 PM    CREATININE 0.38 02/26/2020 10:25 PM    GFRAA NOT REPORTED 02/26/2020 10:25 PM    LABGLOM  02/26/2020 10:25 PM     Pediatric GFR requires additional information. Refer to [facility] website for calculator.     GLUCOSE 95 04/18/2022 02:48 PM    GLUCOSE 122 04/02/2020 09:50 AM    PROT 7.2 02/26/2020 10:25 PM    CALCIUM 9.5 02/26/2020 10:25 PM    BILITOT 0.18 02/26/2020 10:25 PM    ALKPHOS 123 02/26/2020 10:25 PM    AST 22 02/26/2020 10:25 PM    ALT 23 02/26/2020 10:25 PM       POC Tests:   Recent Labs     07/02/22  2100   POCGLU 111*       Coags:   Lab Results   Component Value Date/Time    PROTIME 10.0 02/26/2020 10:25 PM    INR 1.0 02/26/2020 10:25 PM       HCG (If Applicable):   Lab Results   Component Value Date    PREGTESTUR positive 10/28/2019        ABGs: No results found for: PHART, PO2ART, YRA9MZQ, GIO6CXO, BEART, M4YMUWGO     Type & Screen (If Applicable):  No results found for: LABABO, LABRH    Drug/Infectious Status (If Applicable):  Lab Results   Component Value Date/Time    HEPCAB NONREACTIVE 11/22/2021 05:18 PM       COVID-19 Screening (If Applicable): No results found for: COVID19        Anesthesia Evaluation  Patient summary reviewed and Nursing notes reviewed no history of anesthetic complications:   Airway: Mallampati: I  TM distance: >3 FB   Neck ROM: full  Mouth opening: > = 3 FB   Dental:          Pulmonary:Negative Pulmonary ROS and normal exam Cardiovascular:Negative CV ROS  Exercise tolerance: good (>4 METS),            Beta Blocker:  Not on Beta Blocker         Neuro/Psych:   (+) psychiatric history:            GI/Hepatic/Renal:   (+) GERD: well controlled,           Endo/Other: Negative Endo/Other ROS                    Abdominal:             Vascular: negative vascular ROS. Other Findings:           Anesthesia Plan      epidural     ASA 2             Anesthetic plan and risks discussed with patient. Plan discussed with CRNA.                     MOSHE Sharp - CRNA   7/3/2022

## 2022-07-03 NOTE — L&D DELIVERY NOTE
Mother's Information    Labor Events     Labor?: No  Induction: None  Augmentation: AROM  Cervical Ripenin/3/22  9:04 AM Now         Brandy Montano Pending [id number] [[id number]]    Labor Events     Labor?: No   Steroids?: None  Cervical Ripening Date/Time: 7/3/22 09:04:00   Antibiotics Received during Labor?: No  Rupture Date/Time: 7/3/22 09:04:00   Rupture Type: AROM, Intact  Fluid Color: Clear  Fluid Odor: None  Induction: None  Augmentation: AROM, Oxytocin  Labor Complications: None     Anesthesia    Method: Epidural     Start Pushing    Labor onset date/time: 7/3/22 07:55:00 EDT Now   Dilation complete date/time: 7/3/22 10:28:00 EDT Now   Start pushing date/time: 7/3/2022 10:30:22   Decision date/time (emergent ):       Delivery ()    Delivery Date/Time:  7/3/22 10:34:00   Delivery Type: Vaginal, Spontaneous    Details:         Presentation    Presentation: Vertex  _: Occiput  _: Anterior     Shoulder Dystocia    Shoulder Dystocia Present?: No  Add Second Maneuver  Add Third Maneuver  Add Fourth Maneuver  Add Fifth Maneuver  Add Sixth Maneuver  Add Seventh Maneuver  Add Eighth Maneuver  Add Ninth Maneuver     Assisted Delivery Details    Forceps Attempted?: No  Vacuum Extractor Attempted?: No     Document Additional Attempt       Document Additional Attempt             Cord    Vessels: 3 Vessels  Complications: Nuchal Loose  Delayed Cord Clamping?: Yes  Cord Clamped Date/Time: 7/3/2022 10:39:39  Cord Blood Disposition: Lab  Gases Sent?: No     Placenta    Date/Time: 7/3/2022 10:42:23  Removal: Spontaneous  Appearance: Intact  Disposition: Lab     Lacerations       Vaginal Counts    Initial Count Personnel: MOLLY FIERRO RN  Initial Count Verified By: Sheridan Aiden CNM    Sponges Needles Instruments   Initial Counts Correct Correct Correct   Final Counts      If the count is incorrect due to Intentionally Retained Foreign Object (IRFO) add the IRFO LDA in Lines/Drains.   Add LDA: Link to [facility]     Blood Loss  Mother: London Chicas #[id number]   Start of Mother's Information    Delivery Blood Loss  22 0755 - 22 1059    Quantitative Blood Loss (mL) Hospital Encounter 177 grams    Total  177 mL         End of Mother's Information  Mother: London Chicas #[id number]          Delivery Providers    Delivering clinician: MOSHE Baez CNM     Provider Role     Obstetrician    Christopher Suarez Primary Nurse     Primary  Nurse     NICU Nurse     Neonatologist     Anesthesiologist     Nurse Anesthetist     Nurse Practitioner     Midwife     Nursery Nurse           Assessment    Living Status: Living  Delivery Location Comment: LABOR ROOM 209     Apgar Scoring Key:    0 1 2    Skin Color: Blue or pale Acrocyanotic Completely pink    Heart Rate: Absent <100 bpm >100 bpm    Reflex Irritability: No response Grimace Cry or active withdrawal    Muscle Tone: Limp Some flexion Active motion    Respiratory Effort: Absent Weak cry; hypoventilation Good, crying                  Skin Color:   Heart Rate:   Reflex Irritability:   Muscle Tone:   Respiratory Effort:    Total:            1 Minute:    0    2    2    2    2    8        Apgar 1 total from OB History    5 Minute:    1    2    2    2    2    9        Apgar 5 total from OB History    10 Minute:              15 Minute:              20 Minute:                      Apgars Assigned ByCristhian Yip RN          Resuscitation    Method: Stimulation            Measurements           Title    Skin to Skin Initiation Date/Time:     Skin to Skin End Date/Time:

## 2022-07-03 NOTE — PROGRESS NOTES
Updated dr Emerson Barragan pt called out stating she was having a lot of bleeding with a bunch of large clots. Writer entered room and noticed pt was sitting in blood with multiple big clots. Writer massaged uterus firm and u/3. Updated Dr Paulson stated to started oral methergine TID for 2 days.

## 2022-07-03 NOTE — PROGRESS NOTES
Updated Dr. Bev Verde on patient's UA results. Finger stick glucose ordered. Obtained finger stick glucose. This nurse asked pt when she last ate. She stated she had a milkshake, code red mountain dew, and an energy drink before she came into the hospital tonight.

## 2022-07-03 NOTE — ANESTHESIA PROCEDURE NOTES
Epidural Block    Patient location during procedure: OB  Start time: 7/3/2022 6:17 AM  End time: 7/3/2022 6:40 AM  Reason for block: labor epidural  Staffing  Performed: resident/CRNA   Resident/CRNA: MOSHE Gross CRNA  Epidural  Patient position: sitting  Prep: ChloraPrep and site prepped and draped  Patient monitoring: continuous pulse ox and frequent blood pressure checks  Approach: midline  Location: L3-4  Injection technique: STEVE saline  Guidance: anatomy guided.   Provider prep: mask and sterile gloves  Needle  Needle type: Tuohy   Needle gauge: 17 G  Needle length: 3.5 in  Needle insertion depth: 8 cm  Catheter type: side hole  Catheter size: 19 G  Catheter at skin depth: 16 cm  Test dose: negative  Kit: Carrasco Perifix  Lot number: [id number]  Expiration date: 7/31/2023  Assessment  Sensory level: T4  Hemodynamics: stable  Attempts: 1  Outcomes: uncomplicated  Preanesthetic Checklist  Completed: patient identified, IV checked, site marked, risks and benefits discussed, surgical/procedural consents, equipment checked, pre-op evaluation, timeout performed, anesthesia consent given, oxygen available and monitors applied/VS acknowledged

## 2022-07-03 NOTE — PROGRESS NOTES
Spoke with Dr. Maldonado Delgado and updated on patient's status and EFM strip. She ordered pt be d/c home with normal full term pregnancy home instructions and remind her to drink lots of water.

## 2022-07-03 NOTE — PROGRESS NOTES
Pt presents to triage with c/o contractions that started this morning and were around 5 minutes apart but have increased to q2m apart. Pt rates them at 10/10 pain. Denies current vaginal bleeding. Not sure if leaking fluid. +FM. Able to provide urine sample and placed on monitor. Nitrizine completed and was negative. SVE completed and no change noted from previous exam charted in office.

## 2022-07-03 NOTE — PLAN OF CARE
Problem: Pain  Goal: Verbalizes/displays adequate comfort level or baseline comfort level  Outcome: Progressing     Problem: Skin/Tissue Integrity  Goal: Absence of new skin breakdown  Description: 1. Monitor for areas of redness and/or skin breakdown  2. Assess vascular access sites hourly  3. Every 4-6 hours minimum:  Change oxygen saturation probe site  4. Every 4-6 hours:  If on nasal continuous positive airway pressure, respiratory therapy assess nares and determine need for appliance change or resting period.   Outcome: Progressing     Problem: Safety - Adult  Goal: Free from fall injury  Outcome: Progressing     Problem: Vaginal Birth or  Section  Goal: Fetal and maternal status remain reassuring during the birth process  Description:  Birth OB-Pregnancy care plan goal which identifies if the fetal and maternal status remain reassuring during the birth process  Outcome: Progressing

## 2022-07-03 NOTE — PROGRESS NOTES
Discharge instructions reviewed with pt and patient's mom and FOB. All questions answered. Pt verbalized understanding. Pt ambulated off the unit with all belongings and discharge instructions in hand.

## 2022-07-03 NOTE — PROGRESS NOTES
Pt presents to triage with c/o contractions that have increased in intensity. Pt states she can't sleep, walk, go to the bathroom, or do anything else because the pain is so bad. Pt placed into triage and was able to give a urine sample. Pt placed on monitor and SVE completed. Noted pt had made change since she was here previously and was now 4-5 cm dilated. Pt crying and fidgeting during contractions. Called FEI Dubose to update and she ordered pt be admitted and was OK to get epidural once bolus administered.

## 2022-07-03 NOTE — H&P
Department of Obstetrics and Gynecology   Obstetrics History and Physical  Perez Colvin  H&P Admission Inpatient  Note        CHIEF COMPLAINT:  contractions    HISTORY OF PRESENT ILLNESS:      The patient is a 25 y.o. female at 38w7d. OB History        2    Para   1    Term   1            AB        Living   1       SAB        IAB        Ectopic        Molar        Multiple   0    Live Births   1            Patient presents with a chief complaint as above and is being admitted for active phase labor    Estimated Due Date: Estimated Date of Delivery: 22    PRENATAL CARE:    Complicated by: molluscum    PAST OB HISTORY:  OB History        2    Para   1    Term   1            AB        Living   1       SAB        IAB        Ectopic        Molar        Multiple   0    Live Births   1                Past Medical History:        Diagnosis Date    ADHD      Past Surgical History:    No past surgical history on file. Allergies:  Patient has no known allergies.     Social History:    Social History     Socioeconomic History    Marital status: Single     Spouse name: Not on file    Number of children: Not on file    Years of education: Not on file    Highest education level: Not on file   Occupational History    Not on file   Tobacco Use    Smoking status: Current Every Day Smoker     Types: Cigarettes    Smokeless tobacco: Never Used   Vaping Use    Vaping Use: Some days    Substances: Nicotine   Substance and Sexual Activity    Alcohol use: No    Drug use: Not Currently     Types: Marijuana [employer])    Sexual activity: Not Currently     Partners: Male   Other Topics Concern    Not on file   Social History Narrative    Not on file     Social Determinants of Health     Financial Resource Strain:     Difficulty of Paying Living Expenses: Not on file   Food Insecurity:     Worried About Running Out of Food in the Last Year: Not on file    [address] in the Last Year: Not on file   Transportation Needs:     Lack of Transportation (Medical): Not on file    Lack of Transportation (Non-Medical):  Not on file   Physical Activity:     Days of Exercise per Week: Not on file    Minutes of Exercise per Session: Not on file   Stress:     Feeling of Stress : Not on file   Social Connections:     Frequency of Communication with Friends and Family: Not on file    Frequency of Social Gatherings with Friends and Family: Not on file    Attends Spiritism Services: Not on file    Active Member of [address] [place] [address] [employer] or Organizations: Not on file    Attends Club or Organization Meetings: Not on file    Marital Status: Not on file   Intimate Partner Violence:     Fear of Current or Ex-Partner: Not on file    Emotionally Abused: Not on file    Physically Abused: Not on file    Sexually Abused: Not on file   Housing Stability:     Unable to Pay for Housing in the Last Year: Not on file    Number of [place] in the Last Year: Not on file    Unstable Housing in the Last Year: Not on file     Family History:       Problem Relation Age of Onset    Other Father         needed a lung tranplant    Heart Attack Mother     No Known Problems Brother     No Known Problems Sister     No Known Problems Sister     No Known Problems Sister     No Known Problems Sister     No Known Problems Sister     Breast Cancer Maternal Great Grandmother     No Known Problems Maternal Grandmother     No Known Problems Maternal Grandfather      Medications Prior to Admission:  Medications Prior to Admission: cephALEXin (KEFLEX) 500 MG capsule,     REVIEW OF SYSTEMS:    CONSTITUTIONAL:  negative  RESPIRATORY:  negative  CARDIOVASCULAR:  negative  GASTROINTESTINAL:  negative  ALLERGIC/IMMUNOLOGIC:  negative  NEUROLOGICAL:  negative  BEHAVIOR/PSYCH:  negative    PHYSICAL EXAM:  Vitals:    07/03/22 0955 07/03/22 1015 07/03/22 1024 07/03/22 1055   BP: (!) 107/56 (!) 112/51 (!) 101/49 (!) 99/46   Pulse: 72  73 90 Resp:       Temp:       TempSrc:       SpO2:         General appearance:  awake, alert, cooperative, no apparent distress, and appears stated age  Neurologic:  Awake, alert, oriented to name, place and time. Lungs:  No increased work of breathing, good air exchange  Abdomen:  Soft, non tender, gravid, consistent with her gestational age, EFW by Leopald's manouever was 71/2 - 8  lbs   Fetal heart rate:  Reassuring. Pelvis:  Adequate pelvis  Cervix: 5 cm   Contraction frequency:  3-4 minutes    Membranes:  Intact    Labs:   CBC:   Lab Results   Component Value Date/Time    WBC 13.8 2022 05:30 AM    RBC 4.49 2022 05:30 AM    HGB 12.8 2022 05:30 AM    HCT 40.1 2022 05:30 AM    MCV 89.3 2022 05:30 AM    MCH 28.5 2022 05:30 AM    MCHC 31.9 2022 05:30 AM    RDW 14.0 2022 05:30 AM     2022 05:30 AM    MPV 10.6 2022 05:30 AM       ASSESSMENT AND PLAN:    Estimated length of stay: 2 days    Principal Problem:    Normal labor and delivery  Plan: routine orders        Labor: Admit, anticipate normal delivery, routine labor orders  Fetus: Reassuring, Cat 1  GBS: No  Other: anticipate        Darcy De La Torre CNM,7/3/2022 10:57 AM

## 2022-07-04 VITALS
HEART RATE: 90 BPM | RESPIRATION RATE: 16 BRPM | DIASTOLIC BLOOD PRESSURE: 61 MMHG | SYSTOLIC BLOOD PRESSURE: 132 MMHG | TEMPERATURE: 97.3 F | OXYGEN SATURATION: 99 %

## 2022-07-04 LAB
CULTURE: NORMAL
SPECIMEN DESCRIPTION: NORMAL

## 2022-07-04 PROCEDURE — 99024 POSTOP FOLLOW-UP VISIT: CPT | Performed by: OBSTETRICS & GYNECOLOGY

## 2022-07-04 PROCEDURE — 51702 INSERT TEMP BLADDER CATH: CPT

## 2022-07-04 PROCEDURE — 6370000000 HC RX 637 (ALT 250 FOR IP): Performed by: OBSTETRICS & GYNECOLOGY

## 2022-07-04 RX ORDER — IBUPROFEN 800 MG/1
800 TABLET ORAL EVERY 8 HOURS PRN
Qty: 60 TABLET | Refills: 1 | Status: SHIPPED | OUTPATIENT
Start: 2022-07-04

## 2022-07-04 RX ADMIN — METHYLERGONOVINE MALEATE 200 MCG: 0.2 TABLET ORAL at 09:13

## 2022-07-04 RX ADMIN — METHYLERGONOVINE MALEATE 200 MCG: 0.2 TABLET ORAL at 14:41

## 2022-07-04 NOTE — PROGRESS NOTES
Department of Obstetrics and Gynecology  Labor and Delivery  Attending Post Partum Progress Note      SUBJECTIVE:  PPD #1 s/p     OBJECTIVE:  No complaints. Tolerating regular diet. Ambulating without difficulty. +flatus. Voiding without difficulty. Vitals:  /62   Pulse 85   Temp 98.2 °F (36.8 °C) (Oral)   Resp 16   LMP 2021   SpO2 99%   Breastfeeding Unknown     CONSTITUTIONAL:  awake, alert, cooperative, no apparent distress, and appears stated age  EYES:   pupils equal, round and reactive to light, extra ocular muscles intact, sclera clear, conjunctiva normal  LUNGS:  No increased work of breathing, good air exchange, clear to auscultation bilaterally, no crackles or wheezing  CARDIOVASCULAR:  Normal apical impulse, regular rate and rhythm, no murmur noted  ABDOMEN:  normal bowel sounds, soft, non-distended, non-tender, no masses palpated, uterus firm nontender below umbilicus  NEUROLOGIC:  Awake, alert, oriented to name, place and time. Cranial nerves II-XII are grossly intact. SKIN:  normal skin color, texture, turgor    DATA:    CBC:    Lab Results   Component Value Date/Time    WBC 13.8 2022 05:30 AM    RBC 4.49 2022 05:30 AM    HGB 12.8 2022 05:30 AM    HCT 40.1 2022 05:30 AM    MCV 89.3 2022 05:30 AM    RDW 14.0 2022 05:30 AM     2022 05:30 AM       ASSESSMENT & PLAN:      Principal Problem:    1. PPD #1 s/p   Plan: Discharge to home. Follow up in 6 weeks with Devante Willis CNM.

## 2022-07-04 NOTE — PLAN OF CARE
Problem: Pain  Goal: Verbalizes/displays adequate comfort level or baseline comfort level  Outcome: Completed     Problem: Skin/Tissue Integrity  Goal: Absence of new skin breakdown  Description: 1. Monitor for areas of redness and/or skin breakdown  2. Assess vascular access sites hourly  3. Every 4-6 hours minimum:  Change oxygen saturation probe site  4. Every 4-6 hours:  If on nasal continuous positive airway pressure, respiratory therapy assess nares and determine need for appliance change or resting period.   Outcome: Completed     Problem: Safety - Adult  Goal: Free from fall injury  Outcome: Completed     Problem: Vaginal Birth or  Section  Goal: Fetal and maternal status remain reassuring during the birth process  Description:  Birth OB-Pregnancy care plan goal which identifies if the fetal and maternal status remain reassuring during the birth process  Outcome: Completed     Problem: Postpartum  Goal: Experiences normal postpartum course  Description:  Postpartum OB-Pregnancy care plan goal which identifies if the mother is experiencing a normal postpartum course  Outcome: Completed  Goal: Appropriate maternal -  bonding  Description:  Postpartum OB-Pregnancy care plan goal which identifies if the mother and  are bonding appropriately  Outcome: Completed  Goal: Establishment of infant feeding pattern  Description:  Postpartum OB-Pregnancy care plan goal which identifies if the mother is establishing a feeding pattern with their   Outcome: Completed

## 2022-07-04 NOTE — DISCHARGE SUMMARY
Discharge Summary    Date: 2022  Patient Name: Ruma Abrams YOB: 2000 Age: 25 y.o. Admit Date: 7/3/2022  Discharge Date: 2022  Discharge Condition: Good    Admission Diagnosis  Normal labor and delivery (O80)     Discharge Diagnosis  Principal Problem: Normal labor and deliveryResolved Problems: * No resolved hospital problems. [address] [facility] Stay  Narrative of Hospital Course:  26 yo  @ 45 6/7 weeks was admitted to L&D for active labor. Patient progressed well and delivered a viable male infant. Patient did well in the postpartum period. She was in stable condition at time of discharge to home. Consultants:  None    Surgeries/procedures Performed:       Treatments:           Discharge Plan/Disposition:  Home    Hospital/Incidental Findings Requiring Follow Up:    Patient Instructions:    Diet:    Activity:Activity as Tolerated and No Sex for  For number of days (if applicable): Other Instructions: No tampons douching or intercourse x 6 weeks    Provider Follow-Up:   No follow-ups on file.      Significant Diagnostic Studies:    Recent Labs:  Admission on 2Color, UA                                     Date: 2022Value: Yellow      Ref range: Yellow             Status: FinalTurbidity UA                                  Date: 2022Value: Clear       Ref range: Clear              Status: FinalGlucose, Ur                                   Date: 2022Value: NEGATIVE    Ref range: NEGATIVE           Status: FinalBilirubin Urine                               Date: 2022Value: NEGATIVE    Ref range: NEGATIVE           Status: FinalKetones, Urine                                Date: 2022Value: NEGATIVE    Ref range: NEGATIVE           Status: FinalSpecific Gravity, UA                          Date: 2022Value: >1.030*     Ref range: 1.010 - 1.020      Status: FinalUrine Hgb                                     Date: 2022Value: 3+* Ref range: NEGATIVE           Status: FinalpH, UA                                        Date: 07/03/2022Value: 6.0         Ref range: 5.0 - 9.0          Status: FinalProtein, UA                                   Date: 07/03/2022Value: 1+*         Ref range: NEGATIVE           Status: FinalUrobilinogen, Urine                           Date: 07/03/2022Value: Normal      Ref range: Normal             Status: FinalNitrite, Urine                                Date: 07/03/2022Value: POSITIVE*   Ref range: NEGATIVE           Status: FinalLeukocyte Esterase, Urine                     Date: 07/03/2022Value: MODERATE*   Ref range: NEGATIVE           Status: FinalAmphetamine Screen, Ur                        Date: 07/03/2022Value: NEGATIVE    Ref range: NEGATIVE           Status: FinalBarbiturate Screen, Ur                        Date: 07/03/2022Value: NEGATIVE    Ref range: NEGATIVE           Status: FinalBenzodiazepine Screen, Urine                  Date: 07/03/2022Value: NEGATIVE    Ref range: NEGATIVE           Status: FinalCocaine Metabolite, Urine                     Date: 07/03/2022Value: NEGATIVE    Ref range: NEGATIVE           Status: FinalMethadone Screen, Urine                       Date: 07/03/2022Value: NEGATIVE    Ref range: NEGATIVE           Status: FinalOpiates, Urine                                Date: 07/03/2022Value: NEGATIVE    Ref range: NEGATIVE           Status: FinalPhencyclidine, Urine                          Date: 07/03/2022Value: NEGATIVE    Ref range: NEGATIVE           Status: FinalPropoxyphene, Urine                           Date: 07/03/2022Value: NEGATIVE    Ref range: NEGATIVE           Status: FinalCannabinoid Scrn, Ur                          Date: 07/03/2022Value: NEGATIVE    Ref range: NEGATIVE           Status: FinalOxycodone Screen, Ur                          Date: 07/03/2022Value: NEGATIVE    Ref range: NEGATIVE           Status: FinalMethamphetamine, Urine Date: 07/03/2022Value: NEGATIVE    Ref range: NEGATIVE           Status: FinalTricyclic Antidepressants, Urine              Date: 07/03/2022Value: NEGATIVE    Ref range: NEGATIVE           Status: Final              Comment: Drug screen results are to be used for medical purposes only. All positive results are unconfirmed. Testing for employment or legal uses should be sent to a reference laboratory for confirmation. Buprenorphine Urine                           Date: 07/03/2022Value: NEGATIVE    Ref range: NEGATIVE           Status: [address] [facility]                                           Date: 07/03/2022Value: 13.8*       Ref range: 4.5 - 13.5 k/uL    Status: FinalRBC                                           Date: 07/03/2022Value: 4.49        Ref range: 3.95 - 5.11 m/uL   Status: FinalHemoglobin                                    Date: 07/03/2022Value: 12.8        Ref range: 11.9 - 15.1 g/dL   Status: FinalHematocrit                                    Date: 07/03/2022Value: 40.1        Ref range: 36.3 - 47.1 %      Status: FinalMCV                                           Date: 07/03/2022Value: 89.3        Ref range: 82.6 - 102.9 fL    Status: [address]                                           Date: 07/03/2022Value: 28.5        Ref range: 25.2 - 33.5 pg     Status: [address]                                          Date: 07/03/2022Value: 31.9        Ref range: 28.4 - 34.8 g/dL   Status: FinalRDW                                           Date: 07/03/2022Value: 14.0        Ref range: 11.8 - 14.4 %      Status: FinalPlatelets                                     Date: 07/03/2022Value: 225         Ref range: 138 - 453 k/uL     Status: FinalMPV                                           Date: 07/03/2022Value: 10.6        Ref range: 8.1 - 13.5 fL      Status: FinalNRBC Automated                                Date: 07/03/2022Value: 0.0         Ref range: 0.0 per 100 WBC    Status: FinalSeg Neutrophils Date: 07/03/2022Value: 72*         Ref range: 34 - 64 %          Status: FinalLymphocytes                                   Date: 07/03/2022Value: 17*         Ref range: 25 - 45 %          Status: FinalMonocytes                                     Date: 07/03/2022Value: 9*          Ref range: 2 - 8 %            Status: FinalEosinophils %                                 Date: 07/03/2022Value: 1           Ref range: 1 - 4 %            Status: FinalBasophils                                     Date: 07/03/2022Value: 0           Ref range: 0 - 2 %            Status: FinalImmature Granulocytes                         Date: 07/03/2022Value: 1*          Ref range: 0 %                Status: FinalSegs Absolute                                 Date: 07/03/2022Value: 9.94*       Ref range: 1.80 - 8.00 k/uL   Status: FinalAbsolute Lymph #                              Date: 07/03/2022Value: 2.31        Ref range: 1.20 - 5.20 k/uL   Status: FinalAbsolute Mono #                               Date: 07/03/2022Value: 1.26        Ref range: 0.10 - 1.40 k/uL   Status: FinalAbsolute Eos #                                Date: 07/03/2022Value: 0.11        Ref range: 0.00 - 0.44 k/uL   Status: FinalBasophils Absolute                            Date: 07/03/2022Value: 0.03        Ref range: 0.00 - 0.20 k/uL   Status: FinalAbsolute Immature Granulocyte                 Date: 07/03/2022Value: 0.13        Ref range: 0.00 - 0.30 k/uL   Status: Final-                                             Date: 07/03/2022Value:               Status: [address] [facility], UA                                       Date: 07/03/2022Value: 10 TO 20    Ref range: 0 - 5 /HPF         Status: FinalRBC, UA                                       Date: 07/03/2022Value: 10 TO 20    Ref range: 0 - 2 /HPF         Status: FinalEpithelial Cells UA                           Date: 07/03/2022Value: 5 TO 10     Ref range: 0 - 25 /HPF        Status: FinalBacteria, UA Date: 07/03/2022Value: TRACE*      Ref range: None               Status: Final------------    Radiology last 7 days:  No results found. Pending Labs   Order Current Status  SURGICAL PATHOLOGY REPORT Collected (07/03/22 1042)  Surgical Pathology Collected (07/03/22 1118)  Culture, Urine In process      Discharge Medications    Current Discharge Medication ListSTART taking these medicationsibuprofen (ADVIL;MOTRIN) 800 MG tabletTake 1 tablet by mouth every 8 hours as needed for PainQty: 60 tablet Refills: 1    Current Discharge Medication List    Current Discharge Medication List    Current Discharge Medication ListSTOP taking these medicationscephALEXin (Allena Rivera) 500 MG capsuleComments:Reason for Stopping:    Time Spent on Discharge:2E] minutes were spent in patient examination, evaluation, counseling as well as medication reconciliation, prescriptions for required medications, discharge plan, and follow up.     Electronically signed by Eileen Solano MD on 7/4/22 at 1:15 PM EDT

## 2022-07-04 NOTE — DISCHARGE SUMMARY
Discharge instructions signed by mother after questions answered and she verbalizes understanding  Patient off unit in stable condition. Departure Mode: with significant other. , with family member. , accompanied by staff.  and in private car    Mobility at Departure: ambulatory    Discharged to: private residence    Time of Discharge: [phone]

## 2022-07-06 LAB — SURGICAL PATHOLOGY REPORT: NORMAL

## 2022-07-11 NOTE — RESULT ENCOUNTER NOTE
Pt. notified of results and voices understanding. Patient states she is feeling fine, no fever or pain.  Baby is being seen by Jamia Capone NP.

## 2022-07-21 ENCOUNTER — POSTPARTUM VISIT (OUTPATIENT)
Dept: OBGYN | Age: 22
End: 2022-07-21
Payer: COMMERCIAL

## 2022-07-21 VITALS
BODY MASS INDEX: 34.91 KG/M2 | SYSTOLIC BLOOD PRESSURE: 122 MMHG | DIASTOLIC BLOOD PRESSURE: 62 MMHG | HEIGHT: 63 IN | WEIGHT: 197 LBS

## 2022-07-21 DIAGNOSIS — R10.9 ABDOMINAL PAIN, UNSPECIFIED ABDOMINAL LOCATION: ICD-10-CM

## 2022-07-21 LAB
BILIRUBIN, POC: NEGATIVE
BLOOD URINE, POC: NEGATIVE
CLARITY, POC: CLEAR
COLOR, POC: NORMAL
GLUCOSE URINE, POC: NEGATIVE
KETONES, POC: NEGATIVE
LEUKOCYTE EST, POC: NEGATIVE
NITRITE, POC: NEGATIVE
PH, POC: 6
PROTEIN, POC: NEGATIVE
SPECIFIC GRAVITY, POC: 1.01
UROBILINOGEN, POC: NEGATIVE

## 2022-07-21 PROCEDURE — 99024 POSTOP FOLLOW-UP VISIT: CPT | Performed by: ADVANCED PRACTICE MIDWIFE

## 2022-07-21 PROCEDURE — 81002 URINALYSIS NONAUTO W/O SCOPE: CPT | Performed by: ADVANCED PRACTICE MIDWIFE

## 2022-07-21 PROCEDURE — 1111F DSCHRG MED/CURRENT MED MERGE: CPT | Performed by: ADVANCED PRACTICE MIDWIFE

## 2022-07-21 PROCEDURE — G8417 CALC BMI ABV UP PARAM F/U: HCPCS | Performed by: ADVANCED PRACTICE MIDWIFE

## 2022-07-21 PROCEDURE — 4004F PT TOBACCO SCREEN RCVD TLK: CPT | Performed by: ADVANCED PRACTICE MIDWIFE

## 2022-07-21 PROCEDURE — G8427 DOCREV CUR MEDS BY ELIG CLIN: HCPCS | Performed by: ADVANCED PRACTICE MIDWIFE

## 2022-07-21 RX ORDER — MEDROXYPROGESTERONE ACETATE 150 MG/ML
INJECTION, SUSPENSION INTRAMUSCULAR
COMMUNITY
Start: 2022-07-07

## 2022-07-21 NOTE — PROGRESS NOTES
POSTPARTUM EXAM    Date of service: 2022    Bebo Walters  Is a 25 y.o. single female    PT's PCP is: Wilmer Lawrence MD     : 2000     OB History    Para Term  AB Living   2 2 2     2   SAB IAB Ectopic Molar Multiple Live Births           0 2      # Outcome Date GA Lbr Sorin/2nd Weight Sex Delivery Anes PTL Lv   2 Term 24 38w6d 02:33 / 00:06 7 lb 12.2 oz (3.52 kg) M Vag-Spont EPI N GREGORY   1 Term 22 39w0d 08:49 / 00:07 6 lb 11.6 oz (3.049 kg) F Vag-Spont EPI N GREGORY      Complications: Placental abnormality        Social History     Tobacco Use   Smoking Status Every Day    Types: Cigarettes   Smokeless Tobacco Never         Social History     Substance and Sexual Activity   Alcohol Use No         Delivery date2022    Type of delivery:  Spontaneous vaginal delivery    Laceration:No,     Infant gender: male    Infant name: Rosalio Thibodeaux    Are you breast or bottle feeding? Bottle    Have you been sexually active since delivery? Yes    Vital Signs: Blood pressure 122/62, height 5' 3\" (1.6 m), weight 197 lb (89.4 kg), last menstrual period 2021, not currently breastfeeding. Labs:    Blood Type and Rh: A POSITIVE          Allergies: Patient has no known allergies. Current Outpatient Medications:     medroxyPROGESTERone (DEPO-PROVERA) 150 MG/ML injection, , Disp: , Rfl:     ibuprofen (ADVIL;MOTRIN) 800 MG tablet, Take 1 tablet by mouth every 8 hours as needed for Pain, Disp: 60 tablet, Rfl: 1    Last Yearly: never    Last pap: never    Last HPV: never    Chief Complaint   Patient presents with    Postpartum Care     Postpartum exam. Patient had vaginal delivery 2022. C/O headaches every other day and occasional abdominal pain. No urinary symptoms. Baby is formula fed. Patient received Depo Provera  on 2022 at PCP.          How many Hours of sleep do you get a night: 5-6    Do you have a normal appetite: yes    Any problems or pain: abdominal discomfort    Do you feel like you coping well: yes    Is baby sleeping:good    How is baby eating: good    How did first pediatrician visit go: good    EPPDS:   Postpartum Depression Scale 7/21/2022   I have been able to laugh and see the funny side of things As much as I always could   I have looked forward with enjoyment to things As much as I ever did   I have blamed myself unnecessarily when things went wrong No, never   I have been anxious or worried for no good reason No, not at all   I have felt scared or panicky for no good reason No, not much   I haven't been able to cope lately No, most of the time I have coped quite well   I have been so unhappy that I have had difficulty sleeping Not at all   I have felt sad or miserable Not very often   I have been so unhappy that I have been crying No, never   The thought of harming myself has occurred to me Never   Total 3         Results for orders placed or performed in visit on 07/21/22   POCT Urinalysis no Micro   Result Value Ref Range    Color, UA timo     Clarity, UA clear     Glucose, UA POC negative     Bilirubin, UA negative     Ketones, UA negative     Spec Grav, UA 1.015     Blood, UA POC negative     pH, UA 6     Protein, UA POC negative     Urobilinogen, UA negative     Leukocytes, UA negative     Nitrite, UA negative          Nurse: Lorenza LPN    HPI:  PT presents for Post partum exam Follow up 2 weeks after delivery. Patient was seen at pcp office immediately after delivery. Objective   No acute distress  Excellent communications  Well-nourished      Assessment and Plan          Diagnosis Orders   1. Postpartum care following vaginal delivery        2. Abdominal pain, unspecified abdominal location  POCT Urinalysis no Micro        Will follow up headaches with pcp/ Dylan Graves, has ibuprofen rx now from delivery that she takes for the headaches.     Danger signs of abdominal pain reviewed and patient is also scheduled for repeat depo shots at Dylan's office. I am having Yoly Lane maintain her ibuprofen and medroxyPROGESTERone. Return in about 4 weeks (around 8/18/2022) for postpartum . There are no Patient Instructions on file for this visit.     Time spent 30 minutes      MOSHE Garland CNM,7/22/2022 6:01 PM

## 2022-08-16 ENCOUNTER — POSTPARTUM VISIT (OUTPATIENT)
Dept: OBGYN | Age: 22
End: 2022-08-16
Payer: COMMERCIAL

## 2022-08-16 VITALS
BODY MASS INDEX: 35.3 KG/M2 | WEIGHT: 199.2 LBS | HEIGHT: 63 IN | SYSTOLIC BLOOD PRESSURE: 122 MMHG | DIASTOLIC BLOOD PRESSURE: 76 MMHG

## 2022-08-16 LAB — HGB, POC: 14.5

## 2022-08-16 PROCEDURE — G8427 DOCREV CUR MEDS BY ELIG CLIN: HCPCS | Performed by: ADVANCED PRACTICE MIDWIFE

## 2022-08-16 PROCEDURE — 4004F PT TOBACCO SCREEN RCVD TLK: CPT | Performed by: ADVANCED PRACTICE MIDWIFE

## 2022-08-16 PROCEDURE — G8417 CALC BMI ABV UP PARAM F/U: HCPCS | Performed by: ADVANCED PRACTICE MIDWIFE

## 2022-08-16 RX ORDER — AMOXICILLIN 500 MG/1
CAPSULE ORAL
COMMUNITY
Start: 2022-08-12

## 2022-08-16 NOTE — PROGRESS NOTES
POSTPARTUM EXAM    Date of service: 2022    Christiano Mcbride  Is a 25 y.o. single female    PT's PCP is: Ramiro Krishna MD     : 2000     OB History    Para Term  AB Living   2 2 2     2   SAB IAB Ectopic Molar Multiple Live Births           0 2      # Outcome Date GA Lbr Sorin/2nd Weight Sex Delivery Anes PTL Lv   2 Term 22 38w6d 02:33 / 00:06 7 lb 12.2 oz (3.52 kg) M Vag-Spont EPI N GREGORY   1 Term 20 39w0d 08:49 / 00:07 6 lb 11.6 oz (3.049 kg) F Vag-Spont EPI N GREGORY      Complications: Placental abnormality        Social History     Tobacco Use   Smoking Status Every Day    Types: Cigarettes   Smokeless Tobacco Never         Social History     Substance and Sexual Activity   Alcohol Use Yes    Comment: occasional         Delivery date 2022    Type of delivery:  Spontaneous vaginal delivery    Laceration:No,     Infant gender: male    Infant name: Evin Tamayo    Are you breast or bottle feeding? Bottle    Have you been sexually active since delivery? Yes    Vital Signs: Blood pressure 122/76, height 5' 3\" (1.6 m), weight 199 lb 3.2 oz (90.4 kg), last menstrual period 2021, not currently breastfeeding. Labs:    Blood Type and Rh: A POSITIVE          Allergies: Patient has no known allergies. Current Outpatient Medications:     amoxicillin (AMOXIL) 500 MG capsule, , Disp: , Rfl:     medroxyPROGESTERone (DEPO-PROVERA) 150 MG/ML injection, , Disp: , Rfl:     ibuprofen (ADVIL;MOTRIN) 800 MG tablet, Take 1 tablet by mouth every 8 hours as needed for Pain, Disp: 60 tablet, Rfl: 1    Last Yearly:  never    Last pap: never    Last HPV: never    Chief Complaint   Patient presents with    Postpartum Care     Postpartum exam. Patient had vaginal delivery 2022. Baby is formula fed. Patient currently on Depo Provera.         How many Hours of sleep do you get a night: 8-9    Do you have a normal appetite: good    Any problems or pain: toothache    Do you feel like you coping well: yes    Is baby sleeping:fair, has COVID    How is baby eating: fair , has COVID    How did first pediatrician visit go: good    EPPDS:   Postpartum Depression Scale 8/16/2022   I have been able to laugh and see the funny side of things As much as I always could   I have looked forward with enjoyment to things As much as I ever did   I have blamed myself unnecessarily when things went wrong No, never   I have been anxious or worried for no good reason Yes, sometimes   I have felt scared or panicky for no good reason No, not at all   I haven't been able to cope lately No, I have been coping as well as ever   I have been so unhappy that I have had difficulty sleeping Not at all   I have felt sad or miserable Not very often   I have been so unhappy that I have been crying No, never   The thought of harming myself has occurred to me Never   Total 3         No results found for this visit on 08/16/22. History of gestational diabetes? No  If yes order 2 hr GTT    Nurse: Elton GRIFFITH    HPI:  PT presents for Post partum exam Follow up 6 weeks after delivery. Objective   No acute distress  Excellent communications  Well-nourished      Assessment and Plan          Diagnosis Orders   1. Postpartum care and examination  POCT hemoglobin      2. Postpartum care following vaginal delivery            is going to continue to get depo provera at [facility] office      I am having Yoly Lane maintain her ibuprofen, medroxyPROGESTERone, and amoxicillin. Return in about 6 months (around 2/16/2023) for yearly. There are no Patient Instructions on file for this visit.     Time spent 30 minutes      MOSHE Craig CNM,8/16/2022 1:22 PM

## 2022-10-09 ENCOUNTER — HOSPITAL ENCOUNTER (EMERGENCY)
Age: 22
Discharge: HOME | End: 2022-10-09
Payer: COMMERCIAL

## 2022-10-09 VITALS
OXYGEN SATURATION: 99 % | RESPIRATION RATE: 16 BRPM | HEART RATE: 90 BPM | SYSTOLIC BLOOD PRESSURE: 136 MMHG | TEMPERATURE: 98.8 F | DIASTOLIC BLOOD PRESSURE: 84 MMHG

## 2022-10-09 DIAGNOSIS — L30.9 DERMATITIS: Primary | ICD-10-CM

## 2022-10-09 PROCEDURE — 99283 EMERGENCY DEPT VISIT LOW MDM: CPT

## 2022-10-09 PROCEDURE — 6370000000 HC RX 637 (ALT 250 FOR IP): Performed by: PHYSICIAN ASSISTANT

## 2022-10-09 RX ORDER — DEXTROAMPHETAMINE SACCHARATE, AMPHETAMINE ASPARTATE, DEXTROAMPHETAMINE SULFATE AND AMPHETAMINE SULFATE 5; 5; 5; 5 MG/1; MG/1; MG/1; MG/1
20 TABLET ORAL DAILY
COMMUNITY

## 2022-10-09 RX ORDER — PREDNISONE 20 MG/1
20 TABLET ORAL ONCE
Status: COMPLETED | OUTPATIENT
Start: 2022-10-09 | End: 2022-10-09

## 2022-10-09 RX ORDER — PREDNISONE 20 MG/1
20 TABLET ORAL 2 TIMES DAILY
Qty: 10 TABLET | Refills: 0 | Status: SHIPPED
Start: 2022-10-09 | End: 2022-10-14 | Stop reason: ALTCHOICE

## 2022-10-09 RX ADMIN — PREDNISONE 20 MG: 20 TABLET ORAL at 20:15

## 2022-10-09 RX ADMIN — PREDNISONE 20 MG: 20 TABLET ORAL at 20:16

## 2022-10-09 ASSESSMENT — PAIN - FUNCTIONAL ASSESSMENT: PAIN_FUNCTIONAL_ASSESSMENT: NONE - DENIES PAIN

## 2022-10-09 ASSESSMENT — ENCOUNTER SYMPTOMS
RESPIRATORY NEGATIVE: 1
ROS SKIN COMMENTS: SEE HPI

## 2022-10-10 NOTE — ED PROVIDER NOTES
[facility] ED  EMERGENCY DEPARTMENT ENCOUNTER      Pt Name: Constance Halsted  MRN: [id number]  [place] 2000  Date of evaluation: 10/9/2022  Provider: Emeli Griffin, [facility]       Chief Complaint   Patient presents with    Rash     Noted on neck, back and bilateral arms, onset yesterday with urticaria. Denies chest pain/SOB         HISTORY OF PRESENT ILLNESS   (Location/Symptom, Timing/Onset, Context/Setting, Quality, Duration, Modifying Factors, Severity)  Note limiting factors. Constance Halsted is a 25 y.o. female who presents to the emergency department evaluation of diffuse itchy rash to scalp neck bilateral arms back and stomach that began yesterday. Patient notes she used to gain detergent yesterday but notes her grandmother use the same detergent and she is never had a reaction before. Patient denies recent hair dyes new soaps lotions foods. She denies history of fever chills difficulty swallowing or breathing. No other symptoms noted. HPI    Nursing Notes were reviewed. REVIEW OF SYSTEMS    (2-9 systems for level 4, 10 or more for level 5)     Review of Systems   Constitutional: Negative. Negative for fever. HENT: Negative. Respiratory: Negative. Cardiovascular: Negative. Genitourinary: Negative. Skin:         See hpi   Neurological: Negative. Psychiatric/Behavioral: Negative. Except as noted above the remainder of the review of systems was reviewed and negative. PAST MEDICAL HISTORY     Past Medical History:   Diagnosis Date    ADHD          SURGICAL HISTORY     History reviewed. No pertinent surgical history. CURRENT MEDICATIONS       Previous Medications    AMOXICILLIN (AMOXIL) 500 MG CAPSULE        AMPHETAMINE-DEXTROAMPHETAMINE (ADDERALL) 20 MG TABLET    Take 20 mg by mouth daily.     IBUPROFEN (ADVIL;MOTRIN) 800 MG TABLET    Take 1 tablet by mouth every 8 hours as needed for Pain    MEDROXYPROGESTERONE (DEPO-PROVERA) 150 MG/ML INJECTION           ALLERGIES     Patient has no known allergies. FAMILY HISTORY       Family History   Problem Relation Age of Onset    Other Father         needed a lung tranplant    Heart Attack Mother     No Known Problems Brother     No Known Problems Sister     No Known Problems Sister     No Known Problems Sister     No Known Problems Sister     No Known Problems Sister     Breast Cancer Maternal Great Grandmother     No Known Problems Maternal Grandmother     No Known Problems Maternal Grandfather           SOCIAL HISTORY       Social History     Socioeconomic History    Marital status: Single     Spouse name: None    Number of children: None    Years of education: None    Highest education level: None   Tobacco Use    Smoking status: Every Day     Types: Cigarettes    Smokeless tobacco: Never   Vaping Use    Vaping Use: Some days    Substances: Nicotine   Substance and Sexual Activity    Alcohol use: Yes     Comment: occasional    Drug use: Not Currently     Types: Marijuana [place])    Sexual activity: Yes     Partners: Male     Birth control/protection: Injection       SCREENINGS         Mariel Coma Scale  Eye Opening: Spontaneous  Best Verbal Response: Oriented  Best Motor Response: Obeys commands  [place] Coma Scale Score: 15                     CIWA Assessment  BP: 136/84  Heart Rate: 90                 PHYSICAL EXAM    (up to 7 for level 4, 8 or more for level 5)     ED Triage Vitals [10/09/22 1822]   BP Temp Temp Source Heart Rate Resp SpO2 Height Weight   136/84 98.8 °F (37.1 °C) Tympanic 90 16 99 % -- --       Physical Exam  Vitals and nursing note reviewed. Constitutional:       General: She is not in acute distress. Appearance: Normal appearance. She is not ill-appearing or diaphoretic. Cardiovascular:      Pulses: Normal pulses. Heart sounds: Normal heart sounds. Pulmonary:      Effort: Pulmonary effort is normal.      Breath sounds: Normal breath sounds.    Abdominal: Palpations: Abdomen is soft. Musculoskeletal:         General: Normal range of motion. Cervical back: Normal range of motion. No rigidity or tenderness. Skin:     General: Skin is warm and dry. Capillary Refill: Capillary refill takes less than 2 seconds. Findings: Erythema present. Comments: Diffuse erythematous blanchable macular papular rash scalp and trunk suspicious for allergic contact dermatitis no evidence of secondary infection no weeping rash appreciated   Neurological:      General: No focal deficit present. Mental Status: She is alert and oriented to person, place, and time. Mental status is at baseline. Psychiatric:         Mood and Affect: Mood normal.         Behavior: Behavior normal.       DIAGNOSTIC RESULTS     E  Interpretation per the Radiologist below, if available at the time of this note:    No orders to display         ED BEDSIDE ULTRASOUND:   Performed by ED Physician - none    LABS:  Labs Reviewed - No data to display    All other labs were within normal range or not returned as of this dictation. EMERGENCY DEPARTMENT COURSE and DIFFERENTIAL DIAGNOSIS/MDM:   Vitals:    Vitals:    10/09/22 1822   BP: 136/84   Pulse: 90   Resp: 16   Temp: 98.8 °F (37.1 °C)   TempSrc: Tympanic   SpO2: 99%           MDM    82-year-old nontoxic-appearing female has history and clinical exam most consistent with contact dermatitis. Patient demonstrates no dangerous process areas to diffuse to be treated with topical patient will be given a dose of steroids here in the emergency department and discharged on the same. REASSESSMENT      Patient had uncomplicated ER course. Patient demonstrates no dangerous process. CRITICAL CARE TIME   Total Critical Care time was  minutes, excluding separately reportable procedures. There was a high probability of clinically significant/life threatening deterioration in the patient's condition which required my urgent intervention. CONSULTS:  None    PROCEDURES:  Unless otherwise noted below, none     Procedures        FINAL IMPRESSION      1. Dermatitis Stable         DISPOSITION/PLAN   DISPOSITION Decision To Discharge 10/09/2022 08:11:57 PM      PATIENT REFERRED TO:  [place] [address]  [phone]  Schedule an appointment as soon as possible for a visit in 1 week      DISCHARGE MEDICATIONS:  New Prescriptions    PREDNISONE (DELTASONE) 20 MG TABLET    Take 1 tablet by mouth 2 times daily for 5 days     Controlled Substances Monitoring:     No flowsheet data found.     (Please note that portions of this note were completed with a voice recognition program.  Efforts were made to edit the dictations but occasionally words are mis-transcribed.)    Caro Lackey PA-C (electronically signed)  Attending Emergency Physician           Caro Lackey PA-C  10/09/22 2013

## 2022-10-10 NOTE — DISCHARGE INSTRUCTIONS
Follow-up with primary care doctor 7 to 10 days for reevaluation. Start steroids tomorrow as you have already been given a dose here in the emergency department. Promptly return to emergency department for new, changing, worsening of symptoms or other concerns.

## 2022-10-14 ENCOUNTER — HOSPITAL ENCOUNTER (EMERGENCY)
Age: 22
Discharge: HOME | End: 2022-10-14
Attending: EMERGENCY MEDICINE
Payer: COMMERCIAL

## 2022-10-14 VITALS
RESPIRATION RATE: 18 BRPM | DIASTOLIC BLOOD PRESSURE: 77 MMHG | SYSTOLIC BLOOD PRESSURE: 131 MMHG | HEART RATE: 88 BPM | TEMPERATURE: 97.6 F | OXYGEN SATURATION: 99 %

## 2022-10-14 DIAGNOSIS — T78.40XA ALLERGIC REACTION, INITIAL ENCOUNTER: Primary | ICD-10-CM

## 2022-10-14 PROCEDURE — 2580000003 HC RX 258: Performed by: EMERGENCY MEDICINE

## 2022-10-14 PROCEDURE — 99284 EMERGENCY DEPT VISIT MOD MDM: CPT

## 2022-10-14 PROCEDURE — A4216 STERILE WATER/SALINE, 10 ML: HCPCS | Performed by: EMERGENCY MEDICINE

## 2022-10-14 PROCEDURE — 96374 THER/PROPH/DIAG INJ IV PUSH: CPT

## 2022-10-14 PROCEDURE — 6370000000 HC RX 637 (ALT 250 FOR IP): Performed by: EMERGENCY MEDICINE

## 2022-10-14 PROCEDURE — 96375 TX/PRO/DX INJ NEW DRUG ADDON: CPT

## 2022-10-14 PROCEDURE — 2500000003 HC RX 250 WO HCPCS: Performed by: EMERGENCY MEDICINE

## 2022-10-14 PROCEDURE — 6360000002 HC RX W HCPCS: Performed by: EMERGENCY MEDICINE

## 2022-10-14 RX ORDER — PREDNISONE 10 MG/1
TABLET ORAL
Qty: 20 TABLET | Refills: 0 | Status: SHIPPED | OUTPATIENT
Start: 2022-10-14 | End: 2022-10-21

## 2022-10-14 RX ORDER — DIPHENHYDRAMINE HYDROCHLORIDE 50 MG/ML
50 INJECTION INTRAMUSCULAR; INTRAVENOUS ONCE
Status: COMPLETED | OUTPATIENT
Start: 2022-10-14 | End: 2022-10-14

## 2022-10-14 RX ORDER — HYDROXYZINE HYDROCHLORIDE 25 MG/1
25-50 TABLET, FILM COATED ORAL EVERY 8 HOURS PRN
Qty: 20 TABLET | Refills: 0 | Status: SHIPPED | OUTPATIENT
Start: 2022-10-14 | End: 2022-10-19

## 2022-10-14 RX ORDER — PREDNISONE 20 MG/1
40 TABLET ORAL ONCE
Status: COMPLETED | OUTPATIENT
Start: 2022-10-14 | End: 2022-10-14

## 2022-10-14 RX ADMIN — DIPHENHYDRAMINE HYDROCHLORIDE 50 MG: 50 INJECTION, SOLUTION INTRAMUSCULAR; INTRAVENOUS at 01:20

## 2022-10-14 RX ADMIN — PREDNISONE 40 MG: 20 TABLET ORAL at 01:21

## 2022-10-14 RX ADMIN — FAMOTIDINE 20 MG: 10 INJECTION INTRAVENOUS at 01:20

## 2022-10-14 NOTE — ED PROVIDER NOTES
[address] [facility] ED  EMERGENCY DEPARTMENT ENCOUNTER      Pt Name: Rian Pittman  MRN: [id number]  [place] 2000  Date of evaluation: 10/14/2022  Provider: Haydee Feliciano MD    CHIEF COMPLAINT       Chief Complaint   Patient presents with    Allergic Reaction     Pt reports she was here 2 days ago for an allergic reaction. HISTORY OF PRESENT ILLNESS      Rian Pittman is a 25 y.o. female who presents to the emergency department for evaluation of a presumed allergic reaction. Was seen here on 10/10 for the same; was started on prednisone at that time and told to take p.o. Benadryl. Presents back today for worsening pruritic rash, now involving more of her body. Denies any difficulty swallowing, facial or intraoral swelling, tightness in the throat, difficulty breathing, wheezing, abdominal pain, nausea or vomiting. No fever. States that she is uncertain what she is allergic to. No new foods, medications or environmental changes prior to the onset of symptoms. She has no other complaints at this time. Denies possibility of pregnancy secondary to Depo-Provera. REVIEW OF SYSTEMS       As above in HPI. PAST MEDICAL HISTORY     Past Medical History:   Diagnosis Date    ADHD        CURRENT MEDICATIONS       Previous Medications    AMOXICILLIN (AMOXIL) 500 MG CAPSULE        AMPHETAMINE-DEXTROAMPHETAMINE (ADDERALL) 20 MG TABLET    Take 20 mg by mouth daily. IBUPROFEN (ADVIL;MOTRIN) 800 MG TABLET    Take 1 tablet by mouth every 8 hours as needed for Pain    MEDROXYPROGESTERONE (DEPO-PROVERA) 150 MG/ML INJECTION           ALLERGIES       Patient has no known allergies.     FAMILY HISTORY       Family History   Problem Relation Age of Onset    Other Father         needed a lung tranplant    Heart Attack Mother     No Known Problems Brother     No Known Problems Sister     No Known Problems Sister     No Known Problems Sister     No Known Problems Sister     No Known Problems Sister Breast Cancer Maternal Great Grandmother     No Known Problems Maternal Grandmother     No Known Problems Maternal Grandfather           SOCIAL HISTORY       Social History     Tobacco Use    Smoking status: Every Day     Types: Cigarettes    Smokeless tobacco: Never   Vaping Use    Vaping Use: Some days    Substances: Nicotine   Substance Use Topics    Alcohol use: Yes     Comment: occasional    Drug use: Not Currently     Types: Marijuana (Weed)         PHYSICAL EXAM       ED Triage Vitals [10/14/22 0100]   BP Temp Temp src Heart Rate Resp SpO2 Height Weight   131/77 97.6 °F (36.4 °C) -- 88 18 99 % -- --       Physical Exam  Vitals reviewed. Constitutional:       General: She is not in acute distress. Appearance: She is not ill-appearing, toxic-appearing or diaphoretic. HENT:      Head: Normocephalic and atraumatic. Nose: Nose normal. No rhinorrhea. Mouth/Throat:      Mouth: Mucous membranes are moist.      Pharynx: Oropharynx is clear. No oropharyngeal exudate or posterior oropharyngeal erythema. Comments: No intraoral swelling or lesions noted. No trismus. Erythema. Eyes:      General: No scleral icterus. Right eye: No discharge. Left eye: No discharge. Conjunctiva/sclera: Conjunctivae normal.   Cardiovascular:      Rate and Rhythm: Normal rate and regular rhythm. Heart sounds: No murmur heard. Pulmonary:      Effort: Pulmonary effort is normal. No respiratory distress. Breath sounds: Normal breath sounds. No stridor. No wheezing. Abdominal:      General: There is no distension. Palpations: Abdomen is soft. Tenderness: There is no abdominal tenderness. There is no guarding. Musculoskeletal:      Cervical back: Neck supple. Right lower leg: No edema. Left lower leg: No edema. Skin:     General: Skin is warm and dry. Coloration: Skin is not jaundiced or pale. Findings: Rash present. No petechiae.  Rash is urticarial (Present to the bilateral upper and lower extremities and the abdomen. ). Rash is not pustular or vesicular. Neurological:      General: No focal deficit present. Mental Status: She is alert and oriented to person, place, and time. EMERGENCY DEPARTMENT COURSE and DIFFERENTIAL DIAGNOSIS/MDM:     Patient hemodynamically stable and well-appearing. Presents for evaluation of presumed allergic reaction, unknown allergen. On exam, noted to have urticaria present to the trunk and extremities. No suggestion of anaphylaxis based on both history and exam.  She states that the oral Benadryl and the previously prescribed prednisone is not helping. She took a 20 mg dose of prednisone about 2 hours prior to ED arrival.  Will give additional 40 mg now as well as 50 mg of IV Benadryl and 20 mg of IV Pepcid. Plan at this time is to discontinue p.o. Benadryl at home, favoring Atarax instead. Will taper the prednisone over the next few days, starting with an additional 60 mg dose 24 hours from now, as the 20 mg twice daily dosing was not apparently effective. Patient is stable for discharge. Advised to follow-up with PCP to discuss the potential for allergy testing. FINAL IMPRESSION      1. Allergic reaction, initial encounter          DISPOSITION/PLAN     DISPOSITION Discharge - Pending Orders Complete 10/14/2022 01:01:09 AM        DISCHARGE MEDICATIONS:  New Prescriptions    HYDROXYZINE HCL (ATARAX) 25 MG TABLET    Take 1-2 tablets by mouth every 8 hours as needed for Itching    PREDNISONE (DELTASONE) 10 MG TABLET    Take 6 tablets by mouth daily for 1 day, THEN 4 tablets daily for 2 days, THEN 2 tablets daily for 2 days, THEN 1 tablet daily for 2 days.        (Please note that portions of this note were completed with a voice recognition program.  Efforts were made to edit the dictations but occasionally words are mis-transcribed.)    Emily Petersen MD (electronically signed)  Attending Emergency Physician            Miky Lemons MD  10/14/22 0969

## 2022-12-02 ENCOUNTER — HOSPITAL ENCOUNTER (EMERGENCY)
Age: 22
Discharge: HOME | End: 2022-12-02
Attending: EMERGENCY MEDICINE
Payer: COMMERCIAL

## 2022-12-02 VITALS
OXYGEN SATURATION: 97 % | TEMPERATURE: 99.9 F | HEART RATE: 68 BPM | RESPIRATION RATE: 16 BRPM | SYSTOLIC BLOOD PRESSURE: 148 MMHG | DIASTOLIC BLOOD PRESSURE: 70 MMHG

## 2022-12-02 DIAGNOSIS — L03.039 PARONYCHIA OF GREAT TOE: Primary | ICD-10-CM

## 2022-12-02 PROCEDURE — 99283 EMERGENCY DEPT VISIT LOW MDM: CPT

## 2022-12-02 RX ORDER — TRAMADOL HYDROCHLORIDE 50 MG/1
50 TABLET ORAL EVERY 6 HOURS PRN
Qty: 10 TABLET | Refills: 0 | Status: SHIPPED | OUTPATIENT
Start: 2022-12-02 | End: 2022-12-05

## 2022-12-02 RX ORDER — SULFAMETHOXAZOLE AND TRIMETHOPRIM 800; 160 MG/1; MG/1
1 TABLET ORAL 2 TIMES DAILY
Qty: 14 TABLET | Refills: 0 | Status: SHIPPED | OUTPATIENT
Start: 2022-12-02 | End: 2022-12-09

## 2022-12-02 ASSESSMENT — PAIN DESCRIPTION - ORIENTATION: ORIENTATION: RIGHT

## 2022-12-02 ASSESSMENT — PAIN DESCRIPTION - FREQUENCY: FREQUENCY: CONTINUOUS

## 2022-12-02 ASSESSMENT — PAIN SCALES - GENERAL: PAINLEVEL_OUTOF10: 7

## 2022-12-02 ASSESSMENT — PAIN DESCRIPTION - LOCATION: LOCATION: TOE (COMMENT WHICH ONE)

## 2022-12-02 ASSESSMENT — PAIN DESCRIPTION - DESCRIPTORS: DESCRIPTORS: ACHING

## 2022-12-02 ASSESSMENT — PAIN - FUNCTIONAL ASSESSMENT: PAIN_FUNCTIONAL_ASSESSMENT: 0-10

## 2022-12-02 ASSESSMENT — PAIN DESCRIPTION - PAIN TYPE: TYPE: ACUTE PAIN

## 2022-12-02 NOTE — DISCHARGE INSTRUCTIONS
Warm soaks 1-2 times a day for 5-10 minutes. Make sure that you dry very well. Cover infected area with mupirocin ointment 2-3 times a day and cover with a loose sterile dressing such as a Band-Aid. Bactrim as directed until complete. Follow-up with podiatry soon as possible call Monday morning to schedule the earliest available appointment.   Seek medical attention immediately if any worsening pain redness swelling or any other acute concerns

## 2022-12-02 NOTE — LETTER
[address]  Phone: [phone]  Fax: [phone]               December 2, 2022    Patient: Christine Sharma   YOB: 2000   Date of Visit: 12/2/2022       To Whom It May Concern:    Albert Rios was seen and treated in our emergency department on 12/2/2022. She may return to work on 12/04/2022.       Sincerely,       Magen Paulson RN         Signature:__________________________________

## 2022-12-02 NOTE — ED PROVIDER NOTES
[facility] ED  EMERGENCY DEPARTMENT ENCOUNTER      Pt Name: Ebony Celestin  MRN: [id number]  [place] 2000  Date of evaluation: 12/2/2022  Provider: Alexander Barbosa MD    CHIEF COMPLAINT       Chief Complaint   Patient presents with    Toe Pain     Right greater toe pain         HISTORY OF PRESENT ILLNESS   (Location/Symptom, Timing/Onset, Context/Setting, Quality, Duration, Modifying Factors, Severity)  Note limiting factors. Ebony Celestin is a 23 y.o. female who presents to the emergency department      22 yo female redness, pain swelling right great toe. Drainage from site. No injury. Patient has ingrown toenail right great toe. No other acute concerns      Nursing Notes were reviewed. REVIEW OF SYSTEMS    (2-9 systems for level 4, 10 or more for level 5)     Review of Systems   All other systems reviewed and are negative. Except as noted above the remainder of the review of systems was reviewed and negative. PAST MEDICAL HISTORY     Past Medical History:   Diagnosis Date    ADHD          SURGICAL HISTORY     History reviewed. No pertinent surgical history. CURRENT MEDICATIONS       Discharge Medication List as of 12/2/2022  6:10 PM        CONTINUE these medications which have NOT CHANGED    Details   amphetamine-dextroamphetamine (ADDERALL) 20 MG tablet Take 20 mg by mouth daily. Historical Med      medroxyPROGESTERone (DEPO-PROVERA) 150 MG/ML injection Historical Med      ibuprofen (ADVIL;MOTRIN) 800 MG tablet Take 1 tablet by mouth every 8 hours as needed for Pain, Disp-60 tablet, R-1Normal             ALLERGIES     Patient has no known allergies.     FAMILY HISTORY       Family History   Problem Relation Age of Onset    Other Father         needed a lung tranplant    Heart Attack Mother     No Known Problems Brother     No Known Problems Sister     No Known Problems Sister     No Known Problems Sister     No Known Problems Sister     No Known Problems Sister     Breast Cancer Maternal Great Grandmother     No Known Problems Maternal Grandmother     No Known Problems Maternal Grandfather           SOCIAL HISTORY       Social History     Socioeconomic History    Marital status: Single     Spouse name: None    Number of children: None    Years of education: None    Highest education level: None   Tobacco Use    Smoking status: Former     Types: Cigarettes    Smokeless tobacco: Never   Vaping Use    Vaping Use: Some days    Substances: Nicotine   Substance and Sexual Activity    Alcohol use: Yes     Comment: occasional    Drug use: Not Currently     Types: Marijuana [place])    Sexual activity: Yes     Partners: Male     Birth control/protection: Injection       SCREENINGS        Mariel Coma Scale  Eye Opening: Spontaneous  Best Verbal Response: Oriented  Best Motor Response: Obeys commands  [place] Coma Scale Score: 15               PHYSICAL EXAM    (up to 7 for level 4, 8 or more for level 5)     ED Triage Vitals   BP Temp Temp src Pulse Resp SpO2 Height Weight   -- -- -- -- -- -- -- --       Physical Exam  Vitals and nursing note reviewed. HENT:      Head: Normocephalic and atraumatic. Cardiovascular:      Rate and Rhythm: Normal rate. Pulmonary:      Effort: Pulmonary effort is normal. No respiratory distress. Musculoskeletal:         General: Normal range of motion. Skin:     General: Skin is warm and dry. Comments: In grown right great toe nail with draining paronychia   Neurological:      General: No focal deficit present. Mental Status: She is alert and oriented to person, place, and time.        DIAGNOSTIC RESULTS     EKG: All EKG's are interpreted by the Emergency Department Physician who either signs or Co-signs this chart in the absence of a cardiologist.        RADIOLOGY:   Non-plain film images such as CT, Ultrasound and MRI are read by the radiologist. Plain radiographic images are visualized and preliminarily interpreted by the emergency physician with the below findings:        Interpretation per the Radiologist below, if available at the time of this note:    No orders to display         ED BEDSIDE ULTRASOUND:   Performed by ED Physician - none    LABS:  Labs Reviewed - No data to display    All other labs were within normal range or not returned as of this dictation. EMERGENCY DEPARTMENT COURSE and DIFFERENTIAL DIAGNOSIS/MDM:   Vitals:    Vitals:    12/02/22 1800   BP: (!) 148/70   Pulse: 68   Resp: 16   Temp: 99.9 °F (37.7 °C)   TempSrc: Tympanic   SpO2: 97%           MDM  Number of Diagnoses or Management Options  Paronychia of great toe  Diagnosis management comments: Paronychia right great toe actively draining. Home care instructions, ED return and need for podiatry follow up discussed. Stable for discharge home        [place] 4209 time was  minutes, excluding separately reportable procedures. There was a high probability of clinically significant/life threatening deterioration in the patient's condition which required my urgent intervention. CONSULTS:  None    PROCEDURES:  Unless otherwise noted below, none     Procedures        FINAL IMPRESSION      1. Paronychia of great toe          DISPOSITION/PLAN   DISPOSITION Decision To Discharge 12/02/2022 06:04:26 PM      PATIENT REFERRED TO:  Hanny Fraser DPM  [address] [place] [address]  [phone]      Call Monday morning to schedule the earliest available appointment    DISCHARGE MEDICATIONS:  Discharge Medication List as of 12/2/2022  6:10 PM        START taking these medications    Details   sulfamethoxazole-trimethoprim (BACTRIM DS;SEPTRA DS) 800-160 MG per tablet Take 1 tablet by mouth 2 times daily for 7 days, Disp-14 tablet, R-0Normal      mupirocin (BACTROBAN) 2 % ointment Apply topically 3 times daily. , Disp-22 g, R-0, Normal      traMADol (ULTRAM) 50 MG tablet Take 1 tablet by mouth every 6 hours as needed for Pain for up to 3 days. Intended supply: 3 days. Take lowest dose possible to manage pain, Disp-10 tablet, R-0Normal           Controlled Substances Monitoring:     No flowsheet data found.     (Please note that portions of this note were completed with a voice recognition program.  Efforts were made to edit the dictations but occasionally words are mis-transcribed.)    Adalid Garcia MD (electronically signed)  Attending Emergency Physician            Adalid Garcia MD  12/13/22 0647

## 2025-03-31 ENCOUNTER — TRANSCRIBE ORDERS (OUTPATIENT)
Dept: ADMINISTRATIVE | Age: 25
End: 2025-03-31

## 2025-03-31 DIAGNOSIS — N91.1 PERSISTENT POSTPARTUM AMENORRHEA-GALACTORRHEA SYNDROME: Primary | ICD-10-CM

## 2025-03-31 DIAGNOSIS — R10.9 STOMACH ACHE: ICD-10-CM

## 2025-03-31 DIAGNOSIS — O92.6 PERSISTENT POSTPARTUM AMENORRHEA-GALACTORRHEA SYNDROME: Primary | ICD-10-CM

## 2025-04-03 ENCOUNTER — HOSPITAL ENCOUNTER (OUTPATIENT)
Dept: ULTRASOUND IMAGING | Age: 25
Discharge: HOME | End: 2025-04-05
Payer: COMMERCIAL

## 2025-04-03 DIAGNOSIS — N91.1 PERSISTENT POSTPARTUM AMENORRHEA-GALACTORRHEA SYNDROME: ICD-10-CM

## 2025-04-03 DIAGNOSIS — R10.9 STOMACH ACHE: ICD-10-CM

## 2025-04-03 DIAGNOSIS — O92.6 PERSISTENT POSTPARTUM AMENORRHEA-GALACTORRHEA SYNDROME: ICD-10-CM

## 2025-04-03 PROCEDURE — 93976 VASCULAR STUDY: CPT

## 2025-04-15 ENCOUNTER — HOSPITAL ENCOUNTER (OUTPATIENT)
Age: 25
Discharge: HOME | End: 2025-04-15
Payer: COMMERCIAL

## 2025-04-15 LAB
ALBUMIN SERPL-MCNC: 4.7 G/DL (ref 3.5–5.2)
ALBUMIN/GLOB SERPL: 1.8 {RATIO} (ref 1–2.5)
ALP SERPL-CCNC: 62 U/L (ref 35–104)
ALT SERPL-CCNC: 130 U/L (ref 10–35)
ANION GAP SERPL CALCULATED.3IONS-SCNC: 8 MMOL/L (ref 9–16)
AST SERPL-CCNC: 84 U/L (ref 10–35)
BASOPHILS # BLD: 0.05 K/UL (ref 0–0.2)
BASOPHILS NFR BLD: 1 % (ref 0–2)
BILIRUB SERPL-MCNC: 0.5 MG/DL (ref 0–1.2)
BUN SERPL-MCNC: 8 MG/DL (ref 6–20)
BUN/CREAT SERPL: 11 (ref 9–20)
CALCIUM SERPL-MCNC: 9.9 MG/DL (ref 8.6–10.4)
CHLORIDE SERPL-SCNC: 104 MMOL/L (ref 98–107)
CO2 SERPL-SCNC: 26 MMOL/L (ref 20–31)
CREAT SERPL-MCNC: 0.7 MG/DL (ref 0.5–0.9)
EOSINOPHIL # BLD: 0.19 K/UL (ref 0–0.44)
EOSINOPHILS RELATIVE PERCENT: 2 % (ref 1–4)
ERYTHROCYTE [DISTWIDTH] IN BLOOD BY AUTOMATED COUNT: 12.2 % (ref 11.8–14.4)
EST. AVERAGE GLUCOSE BLD GHB EST-MCNC: 74 MG/DL
ESTRADIOL LEVEL: 186 PG/ML
FSH SERPL-ACNC: 5.7 MIU/ML
GFR, ESTIMATED: >90 ML/MIN/1.73M2
GLUCOSE SERPL-MCNC: 92 MG/DL (ref 74–99)
HBA1C MFR BLD: 4.2 % (ref 4–6)
HCG SERPL QL: NEGATIVE
HCT VFR BLD AUTO: 47.9 % (ref 36.3–47.1)
HGB BLD-MCNC: 16.1 G/DL (ref 11.9–15.1)
IMM GRANULOCYTES # BLD AUTO: 0.04 K/UL (ref 0–0.3)
IMM GRANULOCYTES NFR BLD: 0 %
LYMPHOCYTES NFR BLD: 3.18 K/UL (ref 1.1–3.7)
LYMPHOCYTES RELATIVE PERCENT: 31 % (ref 24–43)
MCH RBC QN AUTO: 30.4 PG (ref 25.2–33.5)
MCHC RBC AUTO-ENTMCNC: 33.6 G/DL (ref 28.4–34.8)
MCV RBC AUTO: 90.5 FL (ref 82.6–102.9)
MONOCYTES NFR BLD: 0.75 K/UL (ref 0.1–1.2)
MONOCYTES NFR BLD: 7 % (ref 3–12)
NEUTROPHILS NFR BLD: 59 % (ref 36–65)
NEUTS SEG NFR BLD: 5.92 K/UL (ref 1.5–8.1)
NRBC BLD-RTO: 0 PER 100 WBC
PLATELET # BLD AUTO: 305 K/UL (ref 138–453)
PMV BLD AUTO: 10.2 FL (ref 8.1–13.5)
POTASSIUM SERPL-SCNC: 4.1 MMOL/L (ref 3.7–5.3)
PROLACTIN SERPL-MCNC: 9.29 NG/ML (ref 4.79–23.3)
PROT SERPL-MCNC: 7.4 G/DL (ref 6.6–8.7)
RBC # BLD AUTO: 5.29 M/UL (ref 3.95–5.11)
SODIUM SERPL-SCNC: 138 MMOL/L (ref 136–145)
T4 FREE SERPL-MCNC: 1 NG/DL (ref 0.92–1.68)
TSH SERPL DL<=0.05 MIU/L-ACNC: 2.22 UIU/ML (ref 0.27–4.2)
WBC OTHER # BLD: 10.1 K/UL (ref 3.5–11.3)

## 2025-04-15 PROCEDURE — 84443 ASSAY THYROID STIM HORMONE: CPT

## 2025-04-15 PROCEDURE — 84439 ASSAY OF FREE THYROXINE: CPT

## 2025-04-15 PROCEDURE — 36415 COLL VENOUS BLD VENIPUNCTURE: CPT

## 2025-04-15 PROCEDURE — 80053 COMPREHEN METABOLIC PANEL: CPT

## 2025-04-15 PROCEDURE — 85025 COMPLETE CBC W/AUTO DIFF WBC: CPT

## 2025-04-15 PROCEDURE — 83036 HEMOGLOBIN GLYCOSYLATED A1C: CPT

## 2025-04-15 PROCEDURE — 84146 ASSAY OF PROLACTIN: CPT

## 2025-04-15 PROCEDURE — 83001 ASSAY OF GONADOTROPIN (FSH): CPT

## 2025-04-15 PROCEDURE — 82670 ASSAY OF TOTAL ESTRADIOL: CPT

## 2025-04-15 PROCEDURE — 84703 CHORIONIC GONADOTROPIN ASSAY: CPT

## 2025-04-15 PROCEDURE — 80061 LIPID PANEL: CPT

## 2025-04-16 LAB
CHOLEST SERPL-MCNC: 204 MG/DL (ref 0–199)
CHOLESTEROL/HDL RATIO: 5.2
HDLC SERPL-MCNC: 39 MG/DL
LDLC SERPL CALC-MCNC: 136 MG/DL (ref 0–100)
TRIGL SERPL-MCNC: 144 MG/DL
VLDLC SERPL CALC-MCNC: 29 MG/DL (ref 1–30)

## 2025-04-20 NOTE — LETTER
[address]  Phone: [phone]  Fax: MOSHE Jackson CNM        May 28, 2020     Patient: Sánchez Vásquez   YOB: 2000   Date of Visit: 5/28/2020       To Whom It May Concern: It is my medical opinion that Michelet Leak should be out of school June 1-8 for delivery and postpartum recovery. .    If you have any questions or concerns, please don't hesitate to call.     Sincerely,        MOSHE Frazier CNM
No